# Patient Record
Sex: FEMALE | Race: WHITE
[De-identification: names, ages, dates, MRNs, and addresses within clinical notes are randomized per-mention and may not be internally consistent; named-entity substitution may affect disease eponyms.]

---

## 2017-02-17 ENCOUNTER — HOSPITAL ENCOUNTER (EMERGENCY)
Dept: HOSPITAL 17 - NEPB | Age: 27
Discharge: HOME | End: 2017-02-17
Payer: SELF-PAY

## 2017-02-17 VITALS — WEIGHT: 121.25 LBS | HEIGHT: 63 IN | BODY MASS INDEX: 21.48 KG/M2

## 2017-02-17 VITALS — RESPIRATION RATE: 18 BRPM | HEART RATE: 90 BPM | TEMPERATURE: 97.8 F

## 2017-02-17 DIAGNOSIS — E07.9: ICD-10-CM

## 2017-02-17 DIAGNOSIS — F17.210: ICD-10-CM

## 2017-02-17 DIAGNOSIS — L98.8: Primary | ICD-10-CM

## 2017-02-17 DIAGNOSIS — F31.9: ICD-10-CM

## 2017-02-17 PROCEDURE — 99283 EMERGENCY DEPT VISIT LOW MDM: CPT

## 2017-02-17 NOTE — PD
HPI


Chief Complaint:  Injury


Time Seen by Provider:  15:32


Travel History


International Travel<30 days:  No


Contact w/Intl Traveler<30days:  No


Traveled to known affect area:  No





History of Present Illness


HPI


26-year-old female history of IVDU presents to the ED under the custody of law 

enforcement  [facility] for evaluation of right arm wounds.  Patient states that she'

s been shooting IV Dilaudid, last used today.  She states that she has some 

redness of her ejection site and is worried about infection.  She endorses 

chills, has not measured a temperature at home.  She denies chest pain, 

palpitations, shortness of breath, numbness, tingling, weakness, limitations to 

range of motion of the right arm.  She denies chronic health problems, takes no 

daily medications, NKDA.





PFSH


Past Medical History


Hx Anticoagulant Therapy:  No


ADHD:  Yes


Asthma:  Yes


Autoimmune Disease:  No


Anxiety:  Yes


Depression:  No


Cancer:  No


Cardiovascular Problems:  No


Chemotherapy:  No


Chest Pain:  Yes


Congestive Heart Failure:  No


Cerebrovascular Accident:  No


Diabetes:  No


Diminished Hearing:  No


Gastrointestinal Disorders:  Yes


Genitourinary:  No


Hypertension:  No


Immune Disorder:  No


Implanted Vascular Access Dvce:  No


Musculoskeletal:  No


Neurologic:  No


Psychiatric:  Yes (BIPOLAR DISOREDER)


Respiratory:  No


Immunizations Current:  Yes


Thyroid Disease:  Yes


Ulcer:  No


:  3


Para:  2


Miscarriage:  1





Past Surgical History


Appendectomy:  No


Body Medical Devices:  BORDERLINE PERSONALITY; POLYSUBSTANCE ABUSE; ALCOHOL 

ABUSE; DEFIANT DISORDE


 Section:  Yes (X2)


Cholecystectomy:  No


Gynecologic Surgery:  Yes (C SECTIONS x2)


Hysterectomy:  No


Other Surgery:  Yes (LEFT FOREARM SURGERY)





Social History


Alcohol Use:  Yes (RUM DAILY)


Tobacco Use:  Yes (1 PPD)


Substance Use:  Yes





Allergies-Medications


(Allergen,Severity, Reaction):  


Coded Allergies:  


     *MDRO Multi-Drug Resistant Organism (Verified  Adverse Reaction, Unknown, )


 MRSA (arm wound) - 10/2015


 MRSA PCR Screen NEGATIVE - 3/24/2016 & 16


 ** Cleared per Infection Control **


Reported Meds & Prescriptions





Reported Meds & Active Scripts


Active


Bactroban Topical (Mupirocin) 2% Oint 1 Appl TOPICAL BID 10 Days


Bactrim DS (Sulfamethoxazole-Trimethoprim) 800-160 Mg Tab 1 Tab PO BID








Review of Systems


Except as stated in HPI:  all other systems reviewed are Neg





Physical Exam


Narrative


GENERAL: Well-nourished, well-developed petite white female, appearing younger 

than her stated age, in no acute distress.


SKIN: Warm and dry.  The patient has several puncture marks on the antecubital 

space of the right arm and posterior aspect of the hand.  There is a small 

amount of local reaction with no fluctuance, cellulitic streaking, 

lymphadenopathy.


HEAD: Normocephalic.


EYES: No scleral icterus. No injection or drainage. 


NECK: Supple, trachea midline. No JVD or lymphadenopathy.


CARDIOVASCULAR: Regular rate and rhythm without murmurs, gallops, or rubs. 


RESPIRATORY: Breath sounds clear and equal bilaterally. No accessory muscle use.


GASTROINTESTINAL: Abdomen soft, non-tender, nondistended.  Active bowel sounds.


MUSCULOSKELETAL: No cyanosis, or edema.  Patient retains full, active range of 

motion of eye lateral upper and lower extremities.  She is observed to walk 

with a normal gait.


BACK: Nontender without obvious deformity. No CVA tenderness.





Data


Data


Last Documented VS





Vital Signs








  Date Time  Temp Pulse Resp B/P Pulse Ox O2 Delivery O2 Flow Rate FiO2


 


17 15:05 97.8 90 18     








Orders








 Sulfamet-Trimeth Ds 800-160 Mg (Bactrim (17 16:00)











MDM


Medical Decision Making


Medical Screen Exam Complete:  Yes


Emergency Medical Condition:  Yes


Differential Diagnosis


Folliculitis versus cellulitis versus malingering versus IV drug abuse versus 

other


Narrative Course


26-year-old female history of IVDU presents to the ED under the custody of law 

enforcement  [facility] for evaluation of right arm wounds.  Patient states that she'

s been shooting IV Dilaudid, last used today.  She states that she has some 

redness of her injection sites and is worried about infection.  She endorses 

chills, has not measured a temperature at home.  She denies chest pain, 

palpitations, shortness of breath, numbness, tingling, weakness, limitations to 

range of motion of the right arm.  Vitals reviewed.  Physical exam reveals a 

petite white female in no acute distress.  There are several puncture marks in 

the antecubital space of the right arm and posterior aspect of the right hand.  

Small amount of local reaction with no fluctuance, cellulitic streaking, 

lymphadenopathy present.  Patient retains full, active range of motion of the 

right arm.  Neurovascularly intact.  Review of the patient's record reveals 

blood cultures drawn 16 were negative.  Last wound culture of 16 

through staph aureus, sensitive to Bactrim.  Given the patient's high risk I'll 

prescribe antibiotics.  Prescribed the patient Bactroban ointment twice a day 

10 days.  Also provided prescription for Bactrim DS twice a day 7 days.  

Patient is instructed to apply Bactroban as prescribed, take all antibiotics as 

prescribed even if symptoms resolve.  She indicated understanding of 

instructions.  She is stable and discharged in the law enforcement custody.





Diagnosis





 Primary Impression:  


 IVDU (intravenous drug user)


 Additional Impression:  


 Skin lesion due to intravenous drug abuse


Referrals:  


Primary Care Physician


Patient Instructions:  Bacitracin (On the skin), General Instructions





***Additional Instructions:


Apply Bactroban ointment twice a day.


Bactrim DS twice a day 7 days.


Return to the ED for increased redness, swelling, pus of the right arm.


Otherwise follow-up with her primary care provider.


Return to the ED for any urgent or emergent medical condition.


***Med/Other Pt SpecificInfo:  Prescription(s) given


Scripts


Mupirocin Topical (Bactroban Topical)2% Oint1 Appl TOPICAL BID  10 Days  Ref 0


   Prov:Jose Carlos Macedo MD         17 


Sulfamethoxazole-Trimethoprim (Bactrim DS)800-160 Mg Tab1 Tab PO BID  #14 TAB  

Ref 0


   Prov:Jose Carlos Macedo MD         17


Disposition:  [address] DIS TO [address] LAW ENFORCEMNT


Condition:  Stable








Vilma Shanks 2017 15:42

## 2017-05-24 ENCOUNTER — HOSPITAL ENCOUNTER (INPATIENT)
Dept: HOSPITAL 17 - NEPC | Age: 27
LOS: 1 days | Discharge: LEFT BEFORE DISCHARGE | DRG: 917 | End: 2017-05-25
Attending: INTERNAL MEDICINE | Admitting: INTERNAL MEDICINE
Payer: SELF-PAY

## 2017-05-24 VITALS
OXYGEN SATURATION: 100 % | SYSTOLIC BLOOD PRESSURE: 112 MMHG | RESPIRATION RATE: 20 BRPM | DIASTOLIC BLOOD PRESSURE: 65 MMHG | HEART RATE: 112 BPM

## 2017-05-24 VITALS
HEART RATE: 113 BPM | DIASTOLIC BLOOD PRESSURE: 59 MMHG | SYSTOLIC BLOOD PRESSURE: 97 MMHG | TEMPERATURE: 101.6 F | RESPIRATION RATE: 20 BRPM | OXYGEN SATURATION: 97 %

## 2017-05-24 VITALS
DIASTOLIC BLOOD PRESSURE: 56 MMHG | OXYGEN SATURATION: 99 % | HEART RATE: 111 BPM | RESPIRATION RATE: 16 BRPM | SYSTOLIC BLOOD PRESSURE: 110 MMHG

## 2017-05-24 VITALS — HEART RATE: 113 BPM

## 2017-05-24 VITALS
SYSTOLIC BLOOD PRESSURE: 106 MMHG | DIASTOLIC BLOOD PRESSURE: 57 MMHG | TEMPERATURE: 100.9 F | OXYGEN SATURATION: 99 % | RESPIRATION RATE: 16 BRPM | HEART RATE: 106 BPM

## 2017-05-24 VITALS
RESPIRATION RATE: 16 BRPM | SYSTOLIC BLOOD PRESSURE: 117 MMHG | DIASTOLIC BLOOD PRESSURE: 55 MMHG | HEART RATE: 116 BPM | OXYGEN SATURATION: 99 %

## 2017-05-24 VITALS
HEART RATE: 112 BPM | DIASTOLIC BLOOD PRESSURE: 86 MMHG | RESPIRATION RATE: 17 BRPM | OXYGEN SATURATION: 100 % | SYSTOLIC BLOOD PRESSURE: 136 MMHG

## 2017-05-24 VITALS
DIASTOLIC BLOOD PRESSURE: 56 MMHG | SYSTOLIC BLOOD PRESSURE: 105 MMHG | OXYGEN SATURATION: 99 % | RESPIRATION RATE: 16 BRPM | HEART RATE: 101 BPM

## 2017-05-24 VITALS
OXYGEN SATURATION: 98 % | SYSTOLIC BLOOD PRESSURE: 116 MMHG | DIASTOLIC BLOOD PRESSURE: 55 MMHG | RESPIRATION RATE: 16 BRPM | HEART RATE: 116 BPM

## 2017-05-24 VITALS
SYSTOLIC BLOOD PRESSURE: 128 MMHG | RESPIRATION RATE: 16 BRPM | DIASTOLIC BLOOD PRESSURE: 69 MMHG | OXYGEN SATURATION: 99 % | HEART RATE: 108 BPM

## 2017-05-24 VITALS
RESPIRATION RATE: 16 BRPM | SYSTOLIC BLOOD PRESSURE: 118 MMHG | OXYGEN SATURATION: 100 % | DIASTOLIC BLOOD PRESSURE: 65 MMHG | HEART RATE: 110 BPM

## 2017-05-24 VITALS
HEART RATE: 130 BPM | DIASTOLIC BLOOD PRESSURE: 103 MMHG | OXYGEN SATURATION: 91 % | SYSTOLIC BLOOD PRESSURE: 183 MMHG | RESPIRATION RATE: 13 BRPM

## 2017-05-24 VITALS
HEART RATE: 108 BPM | RESPIRATION RATE: 16 BRPM | SYSTOLIC BLOOD PRESSURE: 115 MMHG | DIASTOLIC BLOOD PRESSURE: 67 MMHG | OXYGEN SATURATION: 100 %

## 2017-05-24 VITALS — OXYGEN SATURATION: 100 %

## 2017-05-24 VITALS
HEART RATE: 119 BPM | HEIGHT: 65 IN | DIASTOLIC BLOOD PRESSURE: 72 MMHG | BODY MASS INDEX: 24.61 KG/M2 | WEIGHT: 147.71 LBS | OXYGEN SATURATION: 97 % | SYSTOLIC BLOOD PRESSURE: 128 MMHG | RESPIRATION RATE: 13 BRPM | TEMPERATURE: 97.6 F

## 2017-05-24 VITALS
RESPIRATION RATE: 13 BRPM | SYSTOLIC BLOOD PRESSURE: 146 MMHG | HEART RATE: 115 BPM | OXYGEN SATURATION: 77 % | DIASTOLIC BLOOD PRESSURE: 71 MMHG

## 2017-05-24 VITALS
DIASTOLIC BLOOD PRESSURE: 84 MMHG | HEART RATE: 117 BPM | RESPIRATION RATE: 13 BRPM | SYSTOLIC BLOOD PRESSURE: 136 MMHG | OXYGEN SATURATION: 100 %

## 2017-05-24 VITALS
SYSTOLIC BLOOD PRESSURE: 105 MMHG | HEART RATE: 105 BPM | RESPIRATION RATE: 16 BRPM | OXYGEN SATURATION: 99 % | DIASTOLIC BLOOD PRESSURE: 59 MMHG

## 2017-05-24 VITALS
OXYGEN SATURATION: 100 % | DIASTOLIC BLOOD PRESSURE: 60 MMHG | RESPIRATION RATE: 16 BRPM | HEART RATE: 114 BPM | SYSTOLIC BLOOD PRESSURE: 112 MMHG

## 2017-05-24 VITALS
SYSTOLIC BLOOD PRESSURE: 108 MMHG | RESPIRATION RATE: 15 BRPM | HEART RATE: 117 BPM | DIASTOLIC BLOOD PRESSURE: 52 MMHG | OXYGEN SATURATION: 99 %

## 2017-05-24 VITALS
HEART RATE: 114 BPM | DIASTOLIC BLOOD PRESSURE: 53 MMHG | SYSTOLIC BLOOD PRESSURE: 107 MMHG | OXYGEN SATURATION: 100 % | RESPIRATION RATE: 16 BRPM

## 2017-05-24 VITALS
SYSTOLIC BLOOD PRESSURE: 111 MMHG | RESPIRATION RATE: 16 BRPM | OXYGEN SATURATION: 100 % | DIASTOLIC BLOOD PRESSURE: 60 MMHG | HEART RATE: 101 BPM

## 2017-05-24 VITALS — RESPIRATION RATE: 20 BRPM | SYSTOLIC BLOOD PRESSURE: 102 MMHG | DIASTOLIC BLOOD PRESSURE: 63 MMHG | HEART RATE: 112 BPM

## 2017-05-24 VITALS — OXYGEN SATURATION: 99 % | HEART RATE: 117 BPM | RESPIRATION RATE: 18 BRPM

## 2017-05-24 VITALS
OXYGEN SATURATION: 100 % | SYSTOLIC BLOOD PRESSURE: 98 MMHG | DIASTOLIC BLOOD PRESSURE: 62 MMHG | HEART RATE: 112 BPM | RESPIRATION RATE: 20 BRPM

## 2017-05-24 VITALS — OXYGEN SATURATION: 99 %

## 2017-05-24 VITALS — OXYGEN SATURATION: 96 %

## 2017-05-24 DIAGNOSIS — J96.00: ICD-10-CM

## 2017-05-24 DIAGNOSIS — N17.9: ICD-10-CM

## 2017-05-24 DIAGNOSIS — F31.9: ICD-10-CM

## 2017-05-24 DIAGNOSIS — F13.20: ICD-10-CM

## 2017-05-24 DIAGNOSIS — R73.9: ICD-10-CM

## 2017-05-24 DIAGNOSIS — F12.20: ICD-10-CM

## 2017-05-24 DIAGNOSIS — F10.20: ICD-10-CM

## 2017-05-24 DIAGNOSIS — F14.20: ICD-10-CM

## 2017-05-24 DIAGNOSIS — T42.4X1A: Primary | ICD-10-CM

## 2017-05-24 DIAGNOSIS — T40.5X1A: ICD-10-CM

## 2017-05-24 LAB
AMPHETAMINE, URINE: (no result)
ANION GAP SERPL CALC-SCNC: 12 MEQ/L (ref 5–15)
APAP SERPL-MCNC: (no result) MCG/ML (ref 10–30)
BACTERIA #/AREA URNS HPF: (no result) /HPF
BARBITURATES, URINE: (no result)
BASE EXCESS BLD CALC-SCNC: 0.9 MMOL/L (ref -2–2)
BASOPHILS # BLD AUTO: 0 TH/MM3 (ref 0–0.2)
BASOPHILS NFR BLD: 0.2 % (ref 0–2)
BENZODIAZEPINES PNL UR: 95 % (ref 90–100)
BETA HCG QUANT: (no result) MIU/ML (ref 0–5)
BLOOD GAS CARBOXYHEMOGLOBIN: 2.2 % (ref 0–4)
BLOOD GAS HCO3: 26 MMOL/L (ref 22–26)
BLOOD GAS OXYGEN CONTENT: 15.7 VOL % (ref 12–20)
BLOOD GAS PCO2: 49 MMHG (ref 38–42)
BUN SERPL-MCNC: 18 MG/DL (ref 7–18)
CHLORIDE SERPL-SCNC: 100 MEQ/L (ref 98–107)
COCAINE UR-MCNC: (no result) NG/ML
COLOR UR: YELLOW
COMMENT (UR): (no result)
CRITICAL VALUE: NO
CULTURE IF INDICATED: (no result)
DRAW SITE: (no result)
EOSINOPHIL # BLD: 0.1 TH/MM3 (ref 0–0.4)
EOSINOPHIL NFR BLD: 0.9 % (ref 0–4)
ERYTHROCYTE [DISTWIDTH] IN BLOOD BY AUTOMATED COUNT: 12.1 % (ref 11.6–17.2)
GFR SERPLBLD BASED ON 1.73 SQ M-ARVRAT: 36 ML/MIN (ref 89–?)
GLUCOSE UR STRIP-MCNC: 300 MG/DL
GRAN CASTS #/AREA URNS LPF: 1 /LPF
HCO3 BLD-SCNC: 26.6 MEQ/L (ref 21–32)
HCT VFR BLD CALC: 34.4 % (ref 35–46)
HEMO FLAGS: (no result)
HGB UR QL STRIP: (no result)
HYALINE CASTS #/AREA URNS LPF: 1 /LPF
KETONES UR STRIP-MCNC: (no result) MG/DL
LYMPHOCYTES # BLD AUTO: 2 TH/MM3 (ref 1–4.8)
LYMPHOCYTES NFR BLD AUTO: 23.9 % (ref 9–44)
MCH RBC QN AUTO: 30.4 PG (ref 27–34)
MCHC RBC AUTO-ENTMCNC: 33.7 % (ref 32–36)
MCV RBC AUTO: 90 FL (ref 80–100)
METHGB MFR BLDA: 0.8 % (ref 0–2)
MONOCYTES NFR BLD: 6.4 % (ref 0–8)
MUCOUS THREADS #/AREA URNS LPF: (no result) /LPF
NEUTROPHILS # BLD AUTO: 5.6 TH/MM3 (ref 1.8–7.7)
NEUTROPHILS NFR BLD AUTO: 68.6 % (ref 16–70)
NITRITE UR QL STRIP: (no result)
NUMBER OF ARTERIAL PUNCTURES: 1
O2/TOTAL GAS SETTING VFR VENT: 60 %
OXYGEN DEVICE: (no result)
PLATELET # BLD: 222 TH/MM3 (ref 150–450)
PO2 BLD: 99 MMHG (ref 61–120)
POTASSIUM SERPL-SCNC: 3.7 MEQ/L (ref 3.5–5.1)
RBC # BLD AUTO: 3.82 MIL/MM3 (ref 4–5.3)
SALICYLATES SERPL-MCNC: 11.7 G/DL (ref 12–16)
SODIUM SERPL-SCNC: 139 MEQ/L (ref 136–145)
SP GR UR STRIP: 1.02 (ref 1–1.03)
SQUAMOUS #/AREA URNS HPF: <1 /HPF (ref 0–5)
STAT: YES
TEMP CORR TO: 98.6
TRANS CELLS #/AREA URNS HPF: <1 /HPF
ULNAR PULSE: PRESENT
VENT SETTINGS: (no result)
WBC # BLD AUTO: 8.2 TH/MM3 (ref 4–11)

## 2017-05-24 PROCEDURE — 81001 URINALYSIS AUTO W/SCOPE: CPT

## 2017-05-24 PROCEDURE — 51702 INSERT TEMP BLADDER CATH: CPT

## 2017-05-24 PROCEDURE — 86403 PARTICLE AGGLUT ANTBDY SCRN: CPT

## 2017-05-24 PROCEDURE — 80048 BASIC METABOLIC PNL TOTAL CA: CPT

## 2017-05-24 PROCEDURE — 82805 BLOOD GASES W/O2 SATURATION: CPT

## 2017-05-24 PROCEDURE — 94002 VENT MGMT INPAT INIT DAY: CPT

## 2017-05-24 PROCEDURE — C9113 INJ PANTOPRAZOLE SODIUM, VIA: HCPCS

## 2017-05-24 PROCEDURE — 71010: CPT

## 2017-05-24 PROCEDURE — 94664 DEMO&/EVAL PT USE INHALER: CPT

## 2017-05-24 PROCEDURE — 94640 AIRWAY INHALATION TREATMENT: CPT

## 2017-05-24 PROCEDURE — 87077 CULTURE AEROBIC IDENTIFY: CPT

## 2017-05-24 PROCEDURE — 80053 COMPREHEN METABOLIC PANEL: CPT

## 2017-05-24 PROCEDURE — 83735 ASSAY OF MAGNESIUM: CPT

## 2017-05-24 PROCEDURE — 87070 CULTURE OTHR SPECIMN AEROBIC: CPT

## 2017-05-24 PROCEDURE — 96374 THER/PROPH/DIAG INJ IV PUSH: CPT

## 2017-05-24 PROCEDURE — 87147 CULTURE TYPE IMMUNOLOGIC: CPT

## 2017-05-24 PROCEDURE — 94150 VITAL CAPACITY TEST: CPT

## 2017-05-24 PROCEDURE — 80307 DRUG TEST PRSMV CHEM ANLYZR: CPT

## 2017-05-24 PROCEDURE — 0BH17EZ INSERTION OF ENDOTRACHEAL AIRWAY INTO TRACHEA, VIA NATURAL OR ARTIFICIAL OPENING: ICD-10-PCS

## 2017-05-24 PROCEDURE — 87205 SMEAR GRAM STAIN: CPT

## 2017-05-24 PROCEDURE — 85025 COMPLETE CBC W/AUTO DIFF WBC: CPT

## 2017-05-24 PROCEDURE — 87040 BLOOD CULTURE FOR BACTERIA: CPT

## 2017-05-24 PROCEDURE — 70450 CT HEAD/BRAIN W/O DYE: CPT

## 2017-05-24 PROCEDURE — 5A1935Z RESPIRATORY VENTILATION, LESS THAN 24 CONSECUTIVE HOURS: ICD-10-PCS

## 2017-05-24 PROCEDURE — 87641 MR-STAPH DNA AMP PROBE: CPT

## 2017-05-24 PROCEDURE — 96375 TX/PRO/DX INJ NEW DRUG ADDON: CPT

## 2017-05-24 PROCEDURE — 87186 SC STD MICRODIL/AGAR DIL: CPT

## 2017-05-24 PROCEDURE — 80178 ASSAY OF LITHIUM: CPT

## 2017-05-24 PROCEDURE — 82948 REAGENT STRIP/BLOOD GLUCOSE: CPT

## 2017-05-24 PROCEDURE — 31500 INSERT EMERGENCY AIRWAY: CPT

## 2017-05-24 PROCEDURE — 76937 US GUIDE VASCULAR ACCESS: CPT

## 2017-05-24 PROCEDURE — 84702 CHORIONIC GONADOTROPIN TEST: CPT

## 2017-05-24 PROCEDURE — 94003 VENT MGMT INPAT SUBQ DAY: CPT

## 2017-05-24 PROCEDURE — 36600 WITHDRAWAL OF ARTERIAL BLOOD: CPT

## 2017-05-24 PROCEDURE — 84100 ASSAY OF PHOSPHORUS: CPT

## 2017-05-24 RX ADMIN — TAZOBACTAM SODIUM AND PIPERACILLIN SODIUM SCH MLS/HR: 500; 4 INJECTION, SOLUTION INTRAVENOUS at 17:20

## 2017-05-24 RX ADMIN — PROPOFOL SCH MLS/HR: 10 INJECTION, EMULSION INTRAVENOUS at 18:39

## 2017-05-24 RX ADMIN — PHENYTOIN SODIUM SCH MLS/HR: 50 INJECTION INTRAMUSCULAR; INTRAVENOUS at 15:17

## 2017-05-24 RX ADMIN — PANTOPRAZOLE SODIUM SCH MG: 40 INJECTION, POWDER, FOR SOLUTION INTRAVENOUS at 15:17

## 2017-05-24 RX ADMIN — PHENYTOIN SODIUM SCH MLS/HR: 50 INJECTION INTRAMUSCULAR; INTRAVENOUS at 14:01

## 2017-05-24 RX ADMIN — PHENYTOIN SODIUM SCH MLS/HR: 50 INJECTION INTRAMUSCULAR; INTRAVENOUS at 17:20

## 2017-05-24 RX ADMIN — IPRATROPIUM BROMIDE AND ALBUTEROL SULFATE SCH AMPULE: .5; 3 SOLUTION RESPIRATORY (INHALATION) at 20:47

## 2017-05-24 RX ADMIN — IPRATROPIUM BROMIDE AND ALBUTEROL SULFATE SCH AMPULE: .5; 3 SOLUTION RESPIRATORY (INHALATION) at 16:15

## 2017-05-24 RX ADMIN — STANDARDIZED SENNA CONCENTRATE AND DOCUSATE SODIUM SCH TAB: 8.6; 5 TABLET, FILM COATED ORAL at 13:00

## 2017-05-24 RX ADMIN — STANDARDIZED SENNA CONCENTRATE AND DOCUSATE SODIUM SCH TAB: 8.6; 5 TABLET, FILM COATED ORAL at 23:20

## 2017-05-24 RX ADMIN — PHENYTOIN SODIUM SCH MLS/HR: 50 INJECTION INTRAMUSCULAR; INTRAVENOUS at 23:28

## 2017-05-24 RX ADMIN — HUMAN INSULIN SCH: 100 INJECTION, SOLUTION SUBCUTANEOUS at 18:00

## 2017-05-24 RX ADMIN — TAZOBACTAM SODIUM AND PIPERACILLIN SODIUM SCH MLS/HR: 500; 4 INJECTION, SOLUTION INTRAVENOUS at 23:20

## 2017-05-24 RX ADMIN — PROPOFOL SCH MLS/HR: 10 INJECTION, EMULSION INTRAVENOUS at 10:35

## 2017-05-24 RX ADMIN — HUMAN INSULIN SCH: 100 INJECTION, SOLUTION SUBCUTANEOUS at 23:20

## 2017-05-24 RX ADMIN — ACETAMINOPHEN PRN MG: 650 SOLUTION ORAL at 18:18

## 2017-05-24 RX ADMIN — PHENYTOIN SODIUM SCH MLS/HR: 50 INJECTION INTRAMUSCULAR; INTRAVENOUS at 15:14

## 2017-05-24 RX ADMIN — HUMAN INSULIN SCH: 100 INJECTION, SOLUTION SUBCUTANEOUS at 13:00

## 2017-05-24 NOTE — RADRPT
EXAM DATE/TIME:  05/24/2017 12:51 

 

[facility] COMPARISON:     

No previous studies available for comparison.

 

 

INDICATIONS :     

Altered mental status

                      

 

RADIATION DOSE:     

39.32 CTDIvol (mGy) 

 

 

 

MEDICAL HISTORY :     

Non-responsive.  

 

SURGICAL HISTORY :      

Non-responsive. 

 

ENCOUNTER:      

Initial

 

ACUITY:      

1 day

 

PAIN SCALE:      

Non-responsive

 

LOCATION:        

cranial 

 

TECHNIQUE:     

Multiple contiguous axial images were obtained of the head.  Using automated exposure control and adj
ustment of the mA and/or kV according to patient size, radiation dose was kept as low as reasonably a
chievable to obtain optimal diagnostic quality images. 

 

FINDINGS:     

 

CEREBRUM:     

The ventricles are normal for age.  No evidence of midline shift, mass lesion, hemorrhage or acute in
farction. Punctate calcification in the region of third ventricle. No extra-axial fluid collections a
re seen.

 

POSTERIOR FOSSA:     

The cerebellum and brainstem are intact.  The 4th ventricle is midline.  The cerebellopontine angle i
s unremarkable.

 

EXTRACRANIAL:     

The visualized portion of the orbits is intact.

 

SKULL:     

The calvaria is intact.  No evidence of skull fracture.

 

CONCLUSION:     

No acute intracranial disease.  

 

 

 

 George Ventura MD on May 24, 2017 at 12:57           

Board Certified Radiologist.

 This report was verified electronically.

## 2017-05-24 NOTE — MH
cc:

CASEY MARIN M.D.

****

 

DATE OF ADMISSION:  5/24/2017

 

HISTORY OF PRESENT ILLNESS

The patient appears to be in her mid 20s.  She presented to [facility] ED via EMS for suspected drug overdose.  She was last seen normal at the

methadone clinic at 6:00 a.m. and when the paramedics arrived they found her

unconscious with a respiratory rate of 4.  She had pinpoint pupils and appears

to have aspirated.  She was noted to have multiple track marks on her upper

extremities.  On arrival to the ER she was tachycardic and hypertensive.  The

patient was intubated with etomidate and succinylcholine and placed on full

mechanical ventilation.  She was given Narcan 2 mg IV with minimal response.

ABG post intubation showed a pH of 7.34, CO2 49, PAO2 99, bicarb 26, sats of

95% on PRVC mode, rate of 16, tidal volume 450, PEEP of 5, FIO2 60%.  In the ER

she was given one liter of normal saline and placed on Diprivan infusion for

sedation.  Chest x-ray post intubation showed ET tube 1 cm above the aurelia,

otherwise clear lungs.  The patient also had a CT scan of the brain which

showed no acute intracranial disease.  Per ED records the patient takes lithium

and her lithium level is pending.

 

The rest of the history is limited as the patient is intubated.

 

PAST MEDICAL HISTORY

Unknown.

 

PAST SURGICAL HISTORY

Unknown.

 

ALLERGIES

Unobtainable.

 

FAMILY HISTORY

Unobtainable.

 

MEDICATIONS

Reported medications include:

1. Lithium.

2. Mirtazapine.

3. Risperidone.

 

REVIEW OF SYSTEMS

As per HPI.  The rest of the review of systems is limited.

 

PHYSICAL EXAMINATION

GENERAL:  The patient is in her mid 20s, intubated for airway protection.

VITAL SIGNS:  Temperature 97.6, pulse 114, respiratory rate 16, blood pressure

115/67, saturation 100% on vent setting PRVC rate of 14, tidal volume 450, PEEP

5, FIO2 60%.

HEENT:  Atraumatic, normocephalic.  Pupils equal, round and reactive to light

and accommodation.  Pinpoint pupils noted.  Non-icteric sclera.

NECK:  Supple.  No JVD, adenopathy or thyromegaly.  Trachea midline.  Orally

intubated.

CARDIOVASCULAR:  Tachycardic.  Normal S1, S2.  No murmurs, rubs or gallops

noted.

PULMONARY:  Bilateral equal air entry.  No crackles or wheezing.

ABDOMEN:  Soft, nontender, no distension.  Positive bowel sounds.

EXTREMITIES:  No cyanosis, clubbing or edema.  Multiple track marks noted in

the right upper extremities.

NEUROLOGIC:  Intubated and on Diprivan infusion.

 

LABORATORY DATA

WBC 8.2, hemoglobin 11.6, hematocrit 34, platelet count 222.

 

Sodium 139, potassium 3.7, chloride 100, CO2 26, BUN 18, creatinine 1.29,

glucose 340, calcium 8.2.

 

Urine drug screen positive for benzodiazepines, cocaine.

Aspirin level less than 1.7.

Tylenol level less than 2.

Alcohol level less than 3.

Lithium level pending.

 

RADIOGRAPHIC STUDIES

Chest x-ray showed no obvious infiltrates or effusion, ET tube 1 cm above the

aurelia.

 

CT scan of the brain showed no acute intracranial abnormalities.

 

IMPRESSION

1. Acute respiratory failure.

2. Altered mental status.

3. Polysubstance abuse with urine drug screen positive for cocaine and

   benzodiazepines.

4. Acute kidney injury.

5. Hyperglycemia.

 

PLAN/RECOMMENDATIONS

1. Continue Diprivan infusion for sedation and daily sedation vacation.

   Monitor neuro status closely.  A CT scan of the brain showed no acute

   intracranial abnormalities.  Urine drug screen positive for benzodiazepines

   and cocaine.  Follow-up on lithium level.  Will consult the psych service

   once extubated.

2. Continue with vent support and maintain sats above 92%.

3. Bronchodilators in the form of DuoNeb q.6h.  Will initiate ICU vent bundle.

 

4. Monitor heart rate and blood pressure closely and maintain MAP greater than

   65 mmHg.  She was given one liter of crystalloid in the ED.  Will place on

   maintenance fluids, NS at 84 mL an hour.

5. Monitor renal function, I's and O's, and electrolyte replacement per

   protocol.  Continue with IV fluids as stated above.

6. Keep n.p.o. for now and place on Protonix 40 mg IV daily for GI prophylaxis.

   Will start nutrition support with tube feeds within the next 24 hours if

   remains intubated.

7. Monitor for signs of infections which include fever and WBC.  Panculture if

   spikes a fever.  A chest x-ray post intubation showed clear lungs and

   urinalysis showed 8 wbc's, negative leukocyte esterase, negative nitrite.

8. Place on medium scale sliding scale insulin with Accu-Cheks q.6h. for

   glycemic control.

9. GI prophylaxis with Protonix 40 mg daily and DVT prophylaxis with SCDs.

10. Further recommendations will be based on the hospital course.

 

Critical care time 35 minutes.

 

 

 

                               __________________________________

                           MD SYLVIE Borden/NAZANIN

D:  5/24/2017/1:06 PM

T:  5/24/2017/1:25 PM

Visit #:  [id number]

Job #:  [id number]

## 2017-05-24 NOTE — PD
HPI


Chief Complaint:  Altered Mental Status


Time Seen by Provider:  10:19


Travel History


International Travel<30 days:  No


Contact w/Intl Traveler<30days:  No


Traveled to known affect area:  No





History of Present Illness


HPI


This is a female who appears to be in her mid 20s, who presents via EMS for 

suspected overdose.  The patient was last seen normal at the methadone clinic 

at 6 AM.  When paramedics arrived they found the patient unconscious with a 

respiratory rate of 4.  She had pinpoint pupils and appeared to have aspirated.

  No further history could be obtained.  The patient was noted to have multiple 

track marks on her upper extremities.





[facility]


Past Medical History


Medical History:  Unable to Obtain


Pregnant?:  Unknown





Past Surgical History


Surgical History:  Unable to Obtain





Social History


Alcohol Use:  No (UNKNOWN )


Tobacco Use:  No


Substance Use:  Yes (UNKNOWN )





Allergies-Medications


(Allergen,Severity, Reaction):  


Coded Allergies:  


     UNOBTAINABLE (Unverified , 5/24/17)


Reported Meds & Prescriptions





Reported Meds & Active Scripts


Active


Reported


Lithium Carbonate 300 Mg Cap 300 Mg PO BID


Risperidone 3 Mg Tab 3 Mg PO Q12HR


Mirtazapine 15 Mg Tab 15 Mg PO HS








Review of Systems


ROS Limitations:  Clinical Condition, Altered Mental Status


Except as stated in HPI:  all other systems reviewed are Neg (unable to obtain 

secondary to patient's altered mental status)





Physical Exam


Narrative


GENERAL: Well-developed well-nourished female who is being bag-valve-mask 

ventilated by the paramedics.


SKIN: Focused skin assessment warm/dry.


HEAD: Atraumatic. Normocephalic. 


EYES: Pupils equal and round and pinpoint. No scleral icterus. No injection or 

drainage. 


ENT: Anterior airway in place.  There was blood noted at the right naris with 

ENT was placed.


NECK: Trachea midline. No JVD.  Supple scars noted.


CARDIOVASCULAR: Regular rate and rhythm.  No murmur appreciated.


RESPIRATORY: Coarse rhonchi bilaterally.  Patient's restaurant rate on her own 

was 4-6.


GASTROINTESTINAL: Abdomen soft, nondistended.


MUSCULOSKELETAL: No obvious deformities. No clubbing.  No cyanosis.  No edema.  

Multiple track marks in her right upper extremities.


NEUROLOGICAL: Unconscious.  She was observed moving her extremities but not to 

command but to painful stimuli.





Data


Data


Last Documented VS





Vital Signs








  Date Time  Temp Pulse Resp B/P Pulse Ox O2 Delivery O2 Flow Rate FiO2


 


5/24/17 11:30  112 20 112/65 100 Ventilator  


 


5/24/17 10:40        100


 


5/24/17 09:47 97.6       








Orders





 Naloxone Inj (Narcan Inj) (5/24/17 09:49)


Succinylcholine Inj (Quelicin Inj) (5/24/17 10:01)


Etomidate Inj (Amidate Inj) (5/24/17 10:02)


Propofol 1000 Mg/100 Ml Inj (Diprivan 10 (5/24/17 10:08)


Basic Metabolic Panel (Bmp) (5/24/17 10:19)


Beta Hcg (Quant/Titer) (5/24/17 10:19)


Complete Blood Count With Diff (5/24/17 10:19)


Urinalysis - C+S If Indicated (5/24/17 10:19)


Chest, Single Ap (5/24/17 10:19)


Ct Brain W/O Iv Contrast(Rout) (5/24/17 10:19)


Arterial Blood Gas (Abg) (5/24/17 10:19)


Iv Access Insert/Monitor (5/24/17 10:19)


Ecg Monitoring (5/24/17 10:19)


Oximetry (5/24/17 10:19)


Sodium Chloride 0.9% Flush (Ns Flush) (5/24/17 10:30)


Restraints Non-Violent MARV.Q3H (5/24/17 10:19)


Drug Screen, Random Urine (5/24/17 10:19)


Alcohol (Ethanol) (5/24/17 10:19)


Salicylates (Aspirin) (5/24/17 10:19)


Tylenol (Acetaminophen) (5/24/17 10:19)


Propofol 1000 Mg/100 Ml Inj (Diprivan 10 (5/24/17 10:30)


^ Infusion (5/24/17 10:19)


RASS (5/24/17 10:19)


Neurological Rass Scale MARV.Q2H (5/24/17 10:19)


Urinary Catheter Insert/Apply (5/24/17 10:19)


Etomidate Inj (Amidate Inj) (5/24/17 11:00)


Succinylcholine Inj (Quelicin Inj) (5/24/17 11:00)


Naloxone Inj (Narcan Inj) (5/24/17 11:00)


Lithium (Li) (5/24/17 11:20)


Sodium Chlor 0.9% 1000 Ml Inj (Ns 1000 M (5/24/17 11:30)


Insulin Human Regular Inj (Novolin R Inj (5/24/17 11:30)


Vascular Access Team Consult/P PRN (5/24/17 11:50)


Vascular Poc Ultrasound (5/24/17 )


Admit Order (Ed Use Only) (5/24/17 12:41)





Labs





 Laboratory Tests








Test 5/24/17 5/24/17 5/24/17 5/24/17





 10:20 10:30 11:32 12:39


 


White Blood Count 8.2 TH/MM3   


 


Red Blood Count 3.82 MIL/MM3   


 


Hemoglobin 11.6 GM/DL   


 


Hematocrit 34.4 %   


 


Mean Corpuscular Volume 90.0 FL   


 


Mean Corpuscular Hemoglobin 30.4 PG   


 


Mean Corpuscular Hemoglobin 33.7 %   





Concent    


 


Red Cell Distribution Width 12.1 %   


 


Platelet Count 222 TH/MM3   


 


Mean Platelet Volume 7.7 FL   


 


Neutrophils (%) (Auto) 68.6 %   


 


Lymphocytes (%) (Auto) 23.9 %   


 


Monocytes (%) (Auto) 6.4 %   


 


Eosinophils (%) (Auto) 0.9 %   


 


Basophils (%) (Auto) 0.2 %   


 


Neutrophils # (Auto) 5.6 TH/MM3   


 


Lymphocytes # (Auto) 2.0 TH/MM3   


 


Monocytes # (Auto) 0.5 TH/MM3   


 


Eosinophils # (Auto) 0.1 TH/MM3   


 


Basophils # (Auto) 0.0 TH/MM3   


 


CBC Comment DIFF FINAL    


 


Differential Comment     


 


Sodium Level 139 MEQ/L   


 


Potassium Level 3.7 MEQ/L   


 


Chloride Level 100 MEQ/L   


 


Carbon Dioxide Level 26.6 MEQ/L   


 


Anion Gap 12 MEQ/L   


 


Blood Urea Nitrogen 18 MG/DL   


 


Creatinine 1.29 MG/DL   


 


Estimat Glomerular Filtration 36 ML/MIN   





Rate    


 


Random Glucose 340 MG/DL   


 


Calcium Level 8.2 MG/DL   


 


Human Chorionic Gonadotropin, LESS THAN 1   





Quant MIU/ML   


 


Salicylates Level LESS THAN 1.7   





 MG/DL   


 


Acetaminophen Level LESS THAN 2.0   





 MCG/ML   


 


Ethyl Alcohol Level LESS THAN 3   





 MG/DL   


 


Urine Color  YELLOW   


 


Urine Turbidity  CLEAR   


 


Urine pH  6.5   


 


Urine Specific Gravity  1.019   


 


Urine Protein  30 mg/dL  


 


Urine Glucose (UA)  300 mg/dL  


 


Urine Ketones  NEG mg/dL  


 


Urine Occult Blood  LARGE   


 


Urine Nitrite  NEG   


 


Urine Bilirubin  NEG   


 


Urine Urobilinogen  LESS THAN 2.0  





  MG/DL  


 


Urine Leukocyte Esterase  NEG   


 


Urine RBC  1 /hpf  


 


Urine WBC  8 /hpf  


 


Urine Squamous Epithelial  <1 /hpf  





Cells    


 


Urine Transitional Epithelial  <1 /hpf  





Cells    


 


Urine Bacteria  RARE /hpf  


 


Urine Hyaline Casts  1 /lpf  


 


Urine Granular Casts  1 /lpf  


 


Urine Mucus  FEW /lpf  


 


Microscopic Urinalysis Comment  CULT NOT  





  INDICATED  


 


Urine Opiates Screen  NEG   


 


Urine Barbiturates Screen  NEG   


 


Urine Amphetamines Screen  NEG   


 


Urine Benzodiazepines Screen  POS   


 


Urine Cocaine Screen  POS   


 


Urine Cannabinoids Screen  NEG   


 


Blood Gas Puncture Site   RT RADIAL  


 


Blood Gas Patient Temperature   98.6  


 


Blood Gas HCO3   26 mmol/L 


 


Blood Gas Base Excess   0.9 mmol/L 


 


Blood Gas Oxygen Saturation   95 % 


 


Arterial Blood pH   7.34  


 


Arterial Blood Partial   49 mmHg 





Pressure CO2    


 


Arterial Blood Partial   99 mmHG 





Pressure O2    


 


Arterial Blood Oxygen Content   15.7 Vol % 


 


Arterial Blood   2.2 % 





Carboxyhemoglobin    


 


Arterial Blood Methemoglobin   0.8 % 


 


Blood Gas Hemoglobin   11.7 G/DL 


 


Oxygen Delivery Device   VENTILATOR  


 


Blood Gas Ventilator Setting   450/16/PEEP5/1.O 





    


 


Blood Gas Inspired Oxygen   60 % 


 


Lithium Level    0.6 MEQ/L











MDM


Medical Decision Making


Medical Screen Exam Complete:  Yes


Emergency Medical Condition:  Yes


Differential Diagnosis


Opiate overdose versus coIngestion versus metabolic arrangement versus 

intracranial injury.


Narrative Course


This is a mid 19-year-old appearing female who was brought in unresponsive with 

suspected opiate overdose.  The patient was given 2 mg of IVD Narcan with 

minimal response.  The patient had pinpoint pupils when she arrived.  There was 

concern that she does prior to arrival.  The patient's fianc did come in after 

we intubated the patient reported that she had been started on psychiatric 

medications.  He was unsure of the medications and was sent home to get them.  

When he arrived one of her medications was lithium.  Lithium level is pending 

at this time.  The patient's also noted to have kidney injury and a blood 

glucose of 340.  She's been started on I V fluids at 1 25 cc per hour.  She's 

been given 6 units of Regular Insulin I V times one dose.  The patient has 

positive cocaine and benzodiazepines in her tox screen.  Lithium level was 0.6.

  CT scan of the brain shows no evidence of acute intra-cranial pathology.  The 

patient will be admitted to the ICU under Dr. Morfin.


Critical Care Narrative


Aggregate critical care time was 45 minutes. Time to perform other separately 

billable procedures was not included in the critical care time. My time did not 

include minutes spent treating any other patients simultaneously or on 

activities that did not directly contribute to the patient's treatment.  





The services I provided to this patient were to treat and/or prevent clinically 

significant deterioration that could result in: Death





I provided critical care services requiring my management, as noted below:


Chart data review, documentation time, medication orders and management, vital 

sign assessments/reviewing monitor data, ordering and reviewing lab tests, 

ordering and interpreting/reviewing x-rays and diagnostic studies, care of the 

patient and discussion of the patient with the admitting physicians.





Procedures


**Procedure Narrative**


Patient was emergently intubated:


INTUBATION: The patient was put in optimal position for the procedure.  Rapid 

sequence intubation was initiated by me using 20 milligrams of etomidate IV and 

100 milligrams of succinyl choline IV.  The patient was intubated with a 7.5 

cuffed endotracheal tube.  Tube placement was confirmed by visualization of the 

tube and balloon passing through the cords, capnometry and subsequent chest x-

ray.  Breath sounds were equal and well aerated bilaterally postintubation.  No 

breath sounds over stomach.  Patient tolerated procedure well.





Diagnosis





 Primary Impression:  


 Overdose


 Additional Impressions:  


 Polysubstance abuse


 Acute kidney injury


 Hyperglycemia





Admitting Information


Admitting Physician Requests:  Admit








Montez Caraballo MD May 24, 2017 10:48

## 2017-05-24 NOTE — RADRPT
EXAM DATE/TIME:  05/24/2017 10:26 

 

[facility] COMPARISON:     

No previous studies available for comparison.

 

                     

INDICATIONS :     

Evaluate lung status.  Patient unresponsive.

                     

 

MEDICAL HISTORY :            

Unobtainable.   

 

SURGICAL HISTORY :        

Unobtainable.

 

ENCOUNTER:     

Initial                                        

 

ACUITY:     

1 day      

 

PAIN SCORE:     

Non-responsive.

 

LOCATION:     

Bilateral chest 

 

FINDINGS:     

A single view of the chest demonstrates the lungs to be symmetrically aerated without evidence of mas
s, infiltrate or effusion.  Endotracheal tube with tip 1 cm above the aurelia. The cardiomediastinal c
ontours are unremarkable.  Osseous structures are intact.

 

CONCLUSION:     

1. Endotracheal tube tip 1 cm above the aurelia.

2. Lungs are clear.

 

 

 

 George Ventura MD on May 24, 2017 at 10:36           

Board Certified Radiologist.

 This report was verified electronically.

## 2017-05-25 VITALS
TEMPERATURE: 101.3 F | SYSTOLIC BLOOD PRESSURE: 112 MMHG | HEART RATE: 101 BPM | RESPIRATION RATE: 16 BRPM | OXYGEN SATURATION: 100 % | DIASTOLIC BLOOD PRESSURE: 61 MMHG

## 2017-05-25 VITALS
SYSTOLIC BLOOD PRESSURE: 118 MMHG | DIASTOLIC BLOOD PRESSURE: 63 MMHG | OXYGEN SATURATION: 99 % | RESPIRATION RATE: 16 BRPM | HEART RATE: 96 BPM

## 2017-05-25 VITALS — OXYGEN SATURATION: 98 %

## 2017-05-25 VITALS
SYSTOLIC BLOOD PRESSURE: 112 MMHG | OXYGEN SATURATION: 98 % | RESPIRATION RATE: 15 BRPM | DIASTOLIC BLOOD PRESSURE: 73 MMHG | HEART RATE: 109 BPM

## 2017-05-25 VITALS
SYSTOLIC BLOOD PRESSURE: 110 MMHG | HEART RATE: 113 BPM | OXYGEN SATURATION: 98 % | DIASTOLIC BLOOD PRESSURE: 74 MMHG | RESPIRATION RATE: 16 BRPM

## 2017-05-25 VITALS
DIASTOLIC BLOOD PRESSURE: 53 MMHG | RESPIRATION RATE: 16 BRPM | SYSTOLIC BLOOD PRESSURE: 101 MMHG | OXYGEN SATURATION: 97 % | HEART RATE: 101 BPM

## 2017-05-25 VITALS
OXYGEN SATURATION: 96 % | SYSTOLIC BLOOD PRESSURE: 109 MMHG | HEART RATE: 108 BPM | RESPIRATION RATE: 14 BRPM | DIASTOLIC BLOOD PRESSURE: 68 MMHG

## 2017-05-25 VITALS
HEART RATE: 109 BPM | OXYGEN SATURATION: 98 % | SYSTOLIC BLOOD PRESSURE: 110 MMHG | RESPIRATION RATE: 16 BRPM | TEMPERATURE: 100.8 F | DIASTOLIC BLOOD PRESSURE: 64 MMHG

## 2017-05-25 VITALS
RESPIRATION RATE: 16 BRPM | OXYGEN SATURATION: 98 % | DIASTOLIC BLOOD PRESSURE: 60 MMHG | SYSTOLIC BLOOD PRESSURE: 110 MMHG | HEART RATE: 97 BPM

## 2017-05-25 VITALS — RESPIRATION RATE: 26 BRPM | SYSTOLIC BLOOD PRESSURE: 139 MMHG | HEART RATE: 118 BPM | DIASTOLIC BLOOD PRESSURE: 82 MMHG

## 2017-05-25 VITALS
RESPIRATION RATE: 16 BRPM | SYSTOLIC BLOOD PRESSURE: 99 MMHG | OXYGEN SATURATION: 97 % | DIASTOLIC BLOOD PRESSURE: 58 MMHG | TEMPERATURE: 100 F | HEART RATE: 106 BPM

## 2017-05-25 VITALS — OXYGEN SATURATION: 94 %

## 2017-05-25 VITALS — HEART RATE: 103 BPM

## 2017-05-25 VITALS
OXYGEN SATURATION: 94 % | HEART RATE: 118 BPM | RESPIRATION RATE: 18 BRPM | DIASTOLIC BLOOD PRESSURE: 75 MMHG | SYSTOLIC BLOOD PRESSURE: 105 MMHG

## 2017-05-25 VITALS — OXYGEN SATURATION: 100 %

## 2017-05-25 VITALS — RESPIRATION RATE: 36 BRPM | HEART RATE: 118 BPM

## 2017-05-25 LAB
ALP SERPL-CCNC: 65 U/L (ref 45–117)
ALT SERPL-CCNC: 113 U/L (ref 10–53)
ANION GAP SERPL CALC-SCNC: 7 MEQ/L (ref 5–15)
AST SERPL-CCNC: 378 U/L (ref 15–37)
BASE EXCESS BLD CALC-SCNC: 0.6 MMOL/L (ref -2–2)
BASOPHILS # BLD AUTO: 0 TH/MM3 (ref 0–0.2)
BASOPHILS NFR BLD: 0.2 % (ref 0–2)
BENZODIAZEPINES PNL UR: 92 % (ref 90–100)
BILIRUB SERPL-MCNC: 1.1 MG/DL (ref 0.2–1)
BLOOD GAS CARBOXYHEMOGLOBIN: 2 % (ref 0–4)
BLOOD GAS HCO3: 25 MMOL/L (ref 22–26)
BLOOD GAS OXYGEN CONTENT: 12.7 VOL % (ref 12–20)
BLOOD GAS PCO2: 39 MMHG (ref 38–42)
BUN SERPL-MCNC: 9 MG/DL (ref 7–18)
CHLORIDE SERPL-SCNC: 104 MEQ/L (ref 98–107)
CRITICAL VALUE: NO
DRAW SITE: (no result)
EOSINOPHIL # BLD: 0.1 TH/MM3 (ref 0–0.4)
EOSINOPHIL NFR BLD: 1 % (ref 0–4)
ERYTHROCYTE [DISTWIDTH] IN BLOOD BY AUTOMATED COUNT: 12.2 % (ref 11.6–17.2)
GFR SERPLBLD BASED ON 1.73 SQ M-ARVRAT: 107 ML/MIN (ref 89–?)
HCO3 BLD-SCNC: 26.9 MEQ/L (ref 21–32)
HCT VFR BLD CALC: 32 % (ref 35–46)
HEMO FLAGS: (no result)
LYMPHOCYTES # BLD AUTO: 1.1 TH/MM3 (ref 1–4.8)
LYMPHOCYTES NFR BLD AUTO: 12.2 % (ref 9–44)
MAGNESIUM SERPL-MCNC: 2.3 MG/DL (ref 1.5–2.5)
MCH RBC QN AUTO: 30.1 PG (ref 27–34)
MCHC RBC AUTO-ENTMCNC: 34 % (ref 32–36)
MCV RBC AUTO: 88.6 FL (ref 80–100)
METHGB MFR BLDA: 1.6 % (ref 0–2)
MONOCYTES NFR BLD: 8.8 % (ref 0–8)
NEUTROPHILS # BLD AUTO: 7.2 TH/MM3 (ref 1.8–7.7)
NEUTROPHILS NFR BLD AUTO: 77.8 % (ref 16–70)
NUMBER OF ARTERIAL PUNCTURES: 1
O2/TOTAL GAS SETTING VFR VENT: 30 %
OXYGEN DEVICE: (no result)
PLATELET # BLD: 151 TH/MM3 (ref 150–450)
PO2 BLD: 77 MMHG (ref 61–120)
POTASSIUM SERPL-SCNC: 3.9 MEQ/L (ref 3.5–5.1)
RBC # BLD AUTO: 3.61 MIL/MM3 (ref 4–5.3)
SALICYLATES SERPL-MCNC: 9.7 G/DL (ref 12–16)
SODIUM SERPL-SCNC: 138 MEQ/L (ref 136–145)
STAT: NO
TEMP CORR TO: 98.6
ULNAR PULSE: PRESENT
VENT SETTINGS: (no result)
WBC # BLD AUTO: 9.3 TH/MM3 (ref 4–11)

## 2017-05-25 RX ADMIN — ACETAMINOPHEN PRN MG: 650 SOLUTION ORAL at 00:18

## 2017-05-25 RX ADMIN — HUMAN INSULIN SCH: 100 INJECTION, SOLUTION SUBCUTANEOUS at 06:00

## 2017-05-25 RX ADMIN — IPRATROPIUM BROMIDE AND ALBUTEROL SULFATE SCH AMPULE: .5; 3 SOLUTION RESPIRATORY (INHALATION) at 07:49

## 2017-05-25 RX ADMIN — PROPOFOL SCH MLS/HR: 10 INJECTION, EMULSION INTRAVENOUS at 03:46

## 2017-05-25 RX ADMIN — PANTOPRAZOLE SODIUM SCH MG: 40 INJECTION, POWDER, FOR SOLUTION INTRAVENOUS at 08:06

## 2017-05-25 RX ADMIN — TAZOBACTAM SODIUM AND PIPERACILLIN SODIUM SCH MLS/HR: 500; 4 INJECTION, SOLUTION INTRAVENOUS at 03:41

## 2017-05-25 RX ADMIN — TAZOBACTAM SODIUM AND PIPERACILLIN SODIUM SCH MLS/HR: 500; 4 INJECTION, SOLUTION INTRAVENOUS at 08:06

## 2017-05-25 RX ADMIN — STANDARDIZED SENNA CONCENTRATE AND DOCUSATE SODIUM SCH TAB: 8.6; 5 TABLET, FILM COATED ORAL at 08:06

## 2017-05-25 RX ADMIN — ACETAMINOPHEN PRN MG: 650 SOLUTION ORAL at 11:46

## 2017-05-25 RX ADMIN — IPRATROPIUM BROMIDE AND ALBUTEROL SULFATE SCH AMPULE: .5; 3 SOLUTION RESPIRATORY (INHALATION) at 03:18

## 2017-05-25 NOTE — PD.CONS
Provisional Diagnosis


Admission Date


May 24, 2017 at 12:43


Axis I.


Polysubstance dependence, including benzodiazepines, cocaine, alcohol, opiates, 

history of bipolar disorder,


Axis II.


Deferred


Axis III.


No significant medical history


Axis IV.


History of polysubstance dependence, incarcerations, recently released from 

half-way last week and


Axis V.


55





History of Present Illness


Service


Psychiatry


Consult Requested By





Primary Care Physician





HPI


The patient is a 27-year-old  woman, domiciled with david in [place], unemployed, supported by [employer], multiple incarcerations, recently 

released from half-way last week, psychiatric history of self reported bipolar 

disorder, polysubstance dependence, including cocaine, marijuana, opiates, 

alcohol, benzodiazepines, she was recently started on methadone 40 mg daily, 

she denies psychiatric hospitalizations, she denies previous suicidal attempts, 

she is on Risperdal 1 mg twice a day and lithium 300 mg twice a day started by 

psychiatrist in [facility]-way "for impulse control", no significant medical history, who 

presents via EMS for suspected overdose.  The patient was last seen normal at 

the methadone clinic at 6 AM.  When paramedics arrived they found the patient 

unconscious with a respiratory rate of 4.  She had pinpoint pupils and appeared 

to have aspirated.  No further history could be obtained.  The patient was 

noted to have multiple track marks on her upper extremities.  Patient was 

intubated and admitted in the ICU.  Consulted to psychiatry to assess potential 

suicidal attempt.  When patient was extubated in the unit patient became 

agitated and was requesting to leave the hospital and signed AMA.  Primary 

medical care team brought the concern of capacity of the patient to signed AMA.

  On psychiatric evaluation today patient is found in her room in the ICU, 

sedated, sleeping, she is accompanied by jagsuly.  Patient is easily arousable, 

and once awake is able to engage in the psychiatric assessment.  Patient 

clarifies that she is not here for a suicidal attempt.  She did not overdose 

with suicidal intentions.  She says that she uses cocaine and benzodiazepines 

yesterday.  She says that she uses Valium 10 mg, which is unable to clarify 

what is she getting this medication from.  She says that she is also in lithium 

300 mg twice a day and Risperdal 1 g twice a day prescribing half-way for impulse 

control.  She is started last Monday a methadone program, she took her regular 

40 mg this morning and after that the next time she opened her eyes she was 

here in the hospital.  She denies depressive symptoms, she denies hopelessness, 

she denies helplessness, denies anhedonia, she denies anxiety, she denies 

suicidal and homicidal ideation, she denies visual and auditory hallucinations.

  Patient is oriented 3, she is able to verbalize understanding of her medical 

condition and consequences of leaving AMA.  No cognitive deficit are present 

during this evaluation.  Patient reports daily use of cocaine, marijuana, 

Dilaudid, and sometimes alcohol.  She clarifies that she is being in half-way 

multiple times for drug possession and assault.  Her fianc at bedside, states 

that he does not think that the patient overdosed with suicidal intentions.  

She says that since the patient got out half-way she has been very sedated, and 

groggy.  She has not reported any suicidal intentions.  He says that he things 

that her problem is that she is mixing too many drugs.  He does not think that 

a psychiatric admission would help her in anyway.  She describes the patient as 

a very aggressive and impulsive person, actually she is spent 3 months in half-way 

for assaulting him, he says he is giving her a second opportunity.





Review of Systems


Constitutional:  DENIES: Diaphoretic episodes, Fatigue, Fever, Weight gain, 

Weight loss, Chills, Dizziness, Change in appetite, Night Sweats


Endocrine:  DENIES: Abnorml menstrual pattern, Heat/cold intolerance, Polydipsia

, Polyuria, Polyphagia


Eyes:  DENIES: Blurred vision, Diplopia, Eye inflammation, Eye pain, Vision loss

, Photosensitivity, Double Vision


Ears, nose, mouth, throat:  DENIES: Tinnitus, Hearing loss, Vertigo, Nasal 

discharge, Oral lesions, Throat pain, Hoarseness, Ear Pain, Running Nose, 

Epistaxis, Sinus Pain, Toothache, Odynophagia


Respiratory:  DENIES: Apneas, Cough, Snoring, Wheezing, Hemoptysis, Sputum 

production, Shortness of breath


Cardiovascular:  DENIES: Chest pain, Palpitations, Syncope, Dyspnea on Exertion

, PND, Lower Extremity Edema, Orthopnea, Claudication


Gastrointestinal:  DENIES: Abdominal pain, Black stools, Bloody stools, 

Constipation, Diarrhea, Nausea, Vomiting, Difficulty Swallowing, Anorexia


Genitourinary:  DENIES: Abnormal vaginal bleeding, Dysmenorrhea, Dyspareunia, 

Sexual dysfunction, Urinary frequency, Urinary incontinence, Urgency, Hematuria

, Dysuria, Nocturia, Vaginal discharge


Integumentary:  DENIES: Abnormal pigmentation, Pruritus, Rash, Nail changes, 

Breast masses, Breast skin changes, Nipple discharge


Hematologic/lymphatic:  DENIES: Bruising, Lymphadenopathy


Immunologic/allergic:  DENIES: Eczema, Urticaria


Psychiatric:  DENIES: Anxiety, Confusion, Mood changes, Depression, 

Hallucinations, Agitation, Suicidal Ideation, Homicidal Ideation, Delusions





Past Family Social History


Coded Allergies:  


     *MDRO Multi-Drug Resistant Organism (Verified  Adverse Reaction, Unknown, 2 /17/17)


 MRSA (arm wound) - 10/2015


 MRSA PCR Screen NEGATIVE - 3/24/2016 & 7/28/16


 ** Cleared per Infection Control **


Active Scripts


Mupirocin Topical (Bactroban Topical)2% Oint1 Appl TOPICAL BID  10 Days  Ref 0


   Prov:Jose Carlos Macedo MD         2/17/17


Sulfamethoxazole-Trimethoprim (Bactrim DS)800-160 Mg Tab1 Tab PO BID  #14 TAB  

Ref 0


   Prov:Jose Carlos Macedo MD         2/17/17


Reported Medications


Lithium Carbonate 300 Mg Chp397 Mg PO BID   Ref 0


   5/24/17


Risperidone 3 Mg Tab3 Mg PO Q12HR  #60 TAB  Ref 0


   5/24/17


Mirtazapine 15 Mg Tab15 Mg PO HS  #30 TAB  Ref 0


   5/24/17


Family History


She denies psychiatric family history


Social History


Patient was born and raised in [place], she lives in [place] with [facility], she 

is unemployed, supported by [employer], has no kids, her highest level of education 

is ninth grade


Patient's Strengths (min. 2)


Verbal communication, she is in a methadone program





Physical Exam


On physical examination processes to be sedated, hypoactive, but no tremors, no 

withdrawal, no EPS present


Vital Signs





 Vital Signs








  Date Time  Temp Pulse Resp B/P Pulse Ox O2 Delivery O2 Flow Rate FiO2


 


5/25/17 10:49     94 Nasal Cannula 5.00 


 


5/25/17 08:20        30


 


5/25/17 06:00  103      


 


5/25/17 04:00 100.8  16 110/64    








 I/O








 5/24/17 5/24/17 5/25/17





 08:00 16:00 00:00


 


Intake Total   856 ml


 


Output Total  500 ml 500 ml


 


Balance  -500 ml 356 ml








Lab Results


Laboratory Tests


Test   5/24/17 5/24/17 5/24/17 5/24/17


   10:20   10:30   11:32   12:39


White Blood Count   8.2 TH/MM3         


Red Blood Count   3.82 MIL/MM3         


Hemoglobin   11.6 GM/DL         


Hematocrit   34.4 %         


Mean Corpuscular Volume   90.0 FL         


Mean Corpuscular Hemoglobin   30.4 PG         


Mean Corpuscular Hemoglobin   33.7 %         


Concent            


Red Cell Distribution Width   12.1 %         


Platelet Count   222 TH/MM3         


Mean Platelet Volume   7.7 FL         


Neutrophils (%) (Auto)   68.6 %         


Lymphocytes (%) (Auto)   23.9 %         


Monocytes (%) (Auto)   6.4 %         


Eosinophils (%) (Auto)   0.9 %         


Basophils (%) (Auto)   0.2 %         


Neutrophils # (Auto)   5.6 TH/MM3         


Lymphocytes # (Auto)   2.0 TH/MM3         


Monocytes # (Auto)   0.5 TH/MM3         


Eosinophils # (Auto)   0.1 TH/MM3         


Basophils # (Auto)   0.0 TH/MM3         


CBC Comment   DIFF FINAL          


Differential Comment             


Sodium Level   139 MEQ/L         


Potassium Level   3.7 MEQ/L         


Chloride Level   100 MEQ/L         


Carbon Dioxide Level   26.6 MEQ/L         


Anion Gap   12 MEQ/L         


Blood Urea Nitrogen   18 MG/DL         


Creatinine   1.29 MG/DL         


Estimat Glomerular Filtration   36 ML/MIN         


Rate            


Random Glucose   340 MG/DL         


Calcium Level   8.2 MG/DL         


Human Chorionic Gonadotropin,   LESS THAN 1         


Quant   MIU/ML         


Salicylates Level   LESS THAN 1.7         


   MG/DL         


Acetaminophen Level   LESS THAN 2.0         


   MCG/ML         


Ethyl Alcohol Level   LESS THAN 3         


   MG/DL         


Urine Color      YELLOW       


Urine Turbidity      CLEAR       


Urine pH      6.5       


Urine Specific Gravity      1.019       


Urine Protein      30 mg/dL      


Urine Glucose (UA)      300 mg/dL      


Urine Ketones      NEG mg/dL      


Urine Occult Blood      LARGE       


Urine Nitrite      NEG       


Urine Bilirubin      NEG       


Urine Urobilinogen      LESS THAN 2.0      


      MG/DL      


Urine Leukocyte Esterase      NEG       


Urine RBC      1 /hpf      


Urine WBC      8 /hpf      


Urine Squamous Epithelial      <1 /hpf      


Cells            


Urine Transitional Epithelial      <1 /hpf      


Cells            


Urine Bacteria      RARE /hpf      


Urine Hyaline Casts      1 /lpf      


Urine Granular Casts      1 /lpf      


Urine Mucus      FEW /lpf      


Microscopic Urinalysis Comment      CULT NOT      


      INDICATED      


Urine Opiates Screen      NEG       


Urine Barbiturates Screen      NEG       


Urine Amphetamines Screen      NEG       


Urine Benzodiazepines Screen      POS       


Urine Cocaine Screen      POS       


Urine Cannabinoids Screen      NEG       


Blood Gas Puncture Site         RT RADIAL    


Blood Gas Patient Temperature         98.6    


Blood Gas HCO3         26 mmol/L   


Blood Gas Base Excess         0.9 mmol/L   


Blood Gas Oxygen Saturation         95 %   


Arterial Blood pH         7.34    


Arterial Blood Partial         49 mmHg   


Pressure CO2            


Arterial Blood Partial         99 mmHG   


Pressure O2            


Arterial Blood Oxygen Content         15.7 Vol %   


Arterial Blood         2.2 %   


Carboxyhemoglobin            


Arterial Blood Methemoglobin         0.8 %   


Blood Gas Hemoglobin         11.7 G/DL   


Oxygen Delivery Device         VENTILATOR    


Blood Gas Ventilator Setting         450/16/PEEP5/1.O   


            


Blood Gas Inspired Oxygen         60 %   


Lithium Level            0.6 MEQ/L


            


Test   5/24/17 5/25/17      


   14:50   04:17      


Nasal Screen MRSA (PCR)   MRSA NOT         


   DETECTED         


White Blood Count      9.3 TH/MM3      


Red Blood Count      3.61 MIL/MM3      


Hemoglobin      10.9 GM/DL      


Hematocrit      32.0 %      


Mean Corpuscular Volume      88.6 FL      


Mean Corpuscular Hemoglobin      30.1 PG      


Mean Corpuscular Hemoglobin      34.0 %      


Concent            


Red Cell Distribution Width      12.2 %      


Platelet Count      151 TH/MM3      


Mean Platelet Volume      7.9 FL      


Neutrophils (%) (Auto)      77.8 %      


Lymphocytes (%) (Auto)      12.2 %      


Monocytes (%) (Auto)      8.8 %      


Eosinophils (%) (Auto)      1.0 %      


Basophils (%) (Auto)      0.2 %      


Neutrophils # (Auto)      7.2 TH/MM3      


Lymphocytes # (Auto)      1.1 TH/MM3      


Monocytes # (Auto)      0.8 TH/MM3      


Eosinophils # (Auto)      0.1 TH/MM3      


Basophils # (Auto)      0.0 TH/MM3      


CBC Comment      DIFF FINAL       


Differential Comment             


Imaging


Last Impressions


Head CT 5/24/17 1019 Signed


Impressions: 


 Service Date/Time:  Wednesday, May 24, 2017 12:51 - CONCLUSION:  No acute 


 intracranial disease.       George Ventura MD 


Chest X-Ray 5/24/17 1019 Signed


Impressions: 


 Service Date/Time:  Wednesday, May 24, 2017 10:26 - CONCLUSION:  1. 

Endotracheal 


 tube tip 1 cm above the aurelia. 2. Lungs are clear.     George Ventura MD





Mental Status Examination


Appearance


 woman, short stature, disheveled, poor hygiene, she seems to be 

sedated, but cooperative


Speech:  Unremarkable


Orientation:  x3


Memory:  Unremarkable


Thought Process:  Logical


Thought Content:  Unremarkable


Language


Fluid and spontaneous


Fund of Knowledge


Adequate for level of education


Hallucination Type:  None


Attention and Concentration:  Good


Suicidal Ideation:  No


Previous Suicide Attempts:  No


Homicidal Ideation:  No


Previous Homicide Attempts:  No


Judgment:  Poor


Affect:  Irritable


Mood:  Angry


Motor Activity:  Normal gait





Assessment & Plan


Problem List:  


(1) Polysubstance abuse


Assessment & Plan:  On psychiatric evaluation today patient is sedated, groggy, 

but cooperative.  She denies depressive symptoms, she denies anxiety, she 

denies [place] and psychosis.  She denies suicidal and homicidal ideation, she 

denies visual and auditory hallucinations.  Patient is logical, coherent and 

relevant, oriented 3, without attention deficit, without any gross cognitive 

impairment present.  On the ICU patient has been hostile, at times agitated and 

aggressive, requesting to sign AMA.  However, during my evaluation patient 

agrees to stay in the hospital, but seems to be very focus in pain medication.  

She does not meet criteria for psychiatric admission at this moment, I did not 

see any reason to Phillips act the patient.  Patient would really benefit of a 

comprehensive inpatient rehabilitation program.  She is already engaged in a 

methadone program.  I recommended to continue her methadone program and try to 

avoid illegal drugs.  Her recent aggressive behavior, hostility and her poor 

judgment are actually driven by her increased necessity and craving of 

psychoactive substances.  But, is also very important to clarify the patient 

has multiple factor in her psychiatric and social history that suggest an 

underlying personality pathology, most probably in the cluster B group.  The 

patient does not have any psychiatric contraindication to continue her medical 

treatment as needed, and she has decision-making capacity to sign AMA at this 

moment.  I do not have any objection in continuing her lithium 300 mg twice a 

day and her Risperdal 1 mg twice a day for impulse control.  Consult 

appreciated.


ICD Code:  F19.10


Assessment & Plan


Estimated LOS:  Matheus Kruger MD May 25, 2017 14:15

## 2017-05-25 NOTE — HHI.CCPN
Subjective


Remarks/Hospital Course


The patient appears to be in her mid 20s.  She presented to [facility] ED via EMS for suspected drug overdose.  She was last seen normal at the


methadone clinic at 6:00 a.m. and when the paramedics arrived they found her 

unconscious with a respiratory rate of 4.  She had pinpoint pupils and appears


to have aspirated.  She was noted to have multiple track marks on her upper 

extremities.  On arrival to the ER she was tachycardic and hypertensive.  The 

patient was intubated with etomidate and succinylcholine and placed on full 

mechanical ventilation.  She was given Narcan 2 mg IV with minimal response. 

ABG post intubation showed a pH of 7.34, CO2 49, PAO2 99, bicarb 26, sats of 95

% on PRVC mode, rate of 16, tidal volume 450, PEEP of 5, FIO2 60%.  In the ER


she was given one liter of normal saline and placed on Diprivan infusion for 

sedation.  Chest x-ray post intubation showed ET tube 1 cm above the aurelia,


otherwise clear lungs.  The patient also had a CT scan of the brain which 

showed no acute intracranial disease.  Per ED records the patient takes lithium 

and her lithium level is pending.





5/25 Patient is sedated with Diprivan and intubated . T: 101.3 at midnight.





Objective





 Vital Signs








  Date Time  Temp Pulse Resp B/P Pulse Ox O2 Delivery O2 Flow Rate FiO2


 


5/25/17 06:00  103      


 


5/25/17 04:03     98   30


 


5/25/17 04:00 100.8  16 110/64    


 


5/24/17 12:00      Ventilator  








 Intake and Output








 5/24/17 5/24/17 5/25/17





 08:00 16:00 00:00


 


Intake Total   856 ml


 


Output Total  500 ml 500 ml


 


Balance  -500 ml 356 ml








Result Diagram:  


5/25/17 0417                                                                   

             5/24/17 1020





Other Results





 Laboratory Tests








Test 5/24/17 5/24/17 5/24/17 5/24/17





 10:20 10:30 11:32 12:39


 


White Blood Count 8.2 TH/MM3   


 


Red Blood Count 3.82 MIL/MM3   


 


Hemoglobin 11.6 GM/DL   


 


Hematocrit 34.4 %   


 


Mean Corpuscular Volume 90.0 FL   


 


Mean Corpuscular Hemoglobin 30.4 PG   


 


Mean Corpuscular Hemoglobin 33.7 %   





Concent    


 


Red Cell Distribution Width 12.1 %   


 


Platelet Count 222 TH/MM3   


 


Mean Platelet Volume 7.7 FL   


 


Neutrophils (%) (Auto) 68.6 %   


 


Lymphocytes (%) (Auto) 23.9 %   


 


Monocytes (%) (Auto) 6.4 %   


 


Eosinophils (%) (Auto) 0.9 %   


 


Basophils (%) (Auto) 0.2 %   


 


Neutrophils # (Auto) 5.6 TH/MM3   


 


Lymphocytes # (Auto) 2.0 TH/MM3   


 


Monocytes # (Auto) 0.5 TH/MM3   


 


Eosinophils # (Auto) 0.1 TH/MM3   


 


Basophils # (Auto) 0.0 TH/MM3   


 


CBC Comment DIFF FINAL    


 


Differential Comment     


 


Sodium Level 139 MEQ/L   


 


Potassium Level 3.7 MEQ/L   


 


Chloride Level 100 MEQ/L   


 


Carbon Dioxide Level 26.6 MEQ/L   


 


Anion Gap 12 MEQ/L   


 


Blood Urea Nitrogen 18 MG/DL   


 


Creatinine 1.29 MG/DL   


 


Estimat Glomerular Filtration 36 ML/MIN   





Rate    


 


Random Glucose 340 MG/DL   


 


Calcium Level 8.2 MG/DL   


 


Human Chorionic Gonadotropin, LESS THAN 1   





Quant MIU/ML   


 


Salicylates Level LESS THAN 1.7   





 MG/DL   


 


Acetaminophen Level LESS THAN 2.0   





 MCG/ML   


 


Ethyl Alcohol Level LESS THAN 3   





 MG/DL   


 


Urine Color  YELLOW   


 


Urine Turbidity  CLEAR   


 


Urine pH  6.5   


 


Urine Specific Gravity  1.019   


 


Urine Protein  30 mg/dL  


 


Urine Glucose (UA)  300 mg/dL  


 


Urine Ketones  NEG mg/dL  


 


Urine Occult Blood  LARGE   


 


Urine Nitrite  NEG   


 


Urine Bilirubin  NEG   


 


Urine Urobilinogen  LESS THAN 2.0  





  MG/DL  


 


Urine Leukocyte Esterase  NEG   


 


Urine RBC  1 /hpf  


 


Urine WBC  8 /hpf  


 


Urine Squamous Epithelial  <1 /hpf  





Cells    


 


Urine Transitional Epithelial  <1 /hpf  





Cells    


 


Urine Bacteria  RARE /hpf  


 


Urine Hyaline Casts  1 /lpf  


 


Urine Granular Casts  1 /lpf  


 


Urine Mucus  FEW /lpf  


 


Microscopic Urinalysis Comment  CULT NOT  





  INDICATED  


 


Urine Opiates Screen  NEG   


 


Urine Barbiturates Screen  NEG   


 


Urine Amphetamines Screen  NEG   


 


Urine Benzodiazepines Screen  POS   


 


Urine Cocaine Screen  POS   


 


Urine Cannabinoids Screen  NEG   


 


Blood Gas Puncture Site   RT RADIAL  


 


Blood Gas Patient Temperature   98.6  


 


Blood Gas HCO3   26 mmol/L 


 


Blood Gas Base Excess   0.9 mmol/L 


 


Blood Gas Oxygen Saturation   95 % 


 


Arterial Blood pH   7.34  


 


Arterial Blood Partial   49 mmHg 





Pressure CO2    


 


Arterial Blood Partial   99 mmHG 





Pressure O2    


 


Arterial Blood Oxygen Content   15.7 Vol % 


 


Arterial Blood   2.2 % 





Carboxyhemoglobin    


 


Arterial Blood Methemoglobin   0.8 % 


 


Blood Gas Hemoglobin   11.7 G/DL 


 


Oxygen Delivery Device   VENTILATOR  


 


Blood Gas Ventilator Setting   450/16/PEEP5/1.O 





    


 


Blood Gas Inspired Oxygen   60 % 


 


Lithium Level    0.6 MEQ/L


 


    





Test 5/24/17 5/25/17  





 14:50 04:17  


 


Nasal Screen MRSA (PCR) MRSA NOT   





 DETECTED   


 


White Blood Count  9.3 TH/MM3  


 


Red Blood Count  3.61 MIL/MM3  


 


Hemoglobin  10.9 GM/DL  


 


Hematocrit  32.0 %  


 


Mean Corpuscular Volume  88.6 FL  


 


Mean Corpuscular Hemoglobin  30.1 PG  


 


Mean Corpuscular Hemoglobin  34.0 %  





Concent    


 


Red Cell Distribution Width  12.2 %  


 


Platelet Count  151 TH/MM3  


 


Mean Platelet Volume  7.9 FL  


 


Neutrophils (%) (Auto)  77.8 %  


 


Lymphocytes (%) (Auto)  12.2 %  


 


Monocytes (%) (Auto)  8.8 %  


 


Eosinophils (%) (Auto)  1.0 %  


 


Basophils (%) (Auto)  0.2 %  


 


Neutrophils # (Auto)  7.2 TH/MM3  


 


Lymphocytes # (Auto)  1.1 TH/MM3  


 


Monocytes # (Auto)  0.8 TH/MM3  


 


Eosinophils # (Auto)  0.1 TH/MM3  


 


Basophils # (Auto)  0.0 TH/MM3  


 


CBC Comment  DIFF FINAL   


 


Differential Comment     








Imaging





Last Impressions








Head CT 5/24/17 1019 Signed





Impressions: 





 Service Date/Time:  Wednesday, May 24, 2017 12:51 - CONCLUSION:  No acute 





 intracranial disease.       George Ventura MD 


 


Chest X-Ray 5/24/17 1019 Signed





Impressions: 





 Service Date/Time:  Wednesday, May 24, 2017 10:26 - CONCLUSION:  1. 

Endotracheal 





 tube tip 1 cm above the aurelia. 2. Lungs are clear.     George Ventura MD 








Objective Remarks


GENERAL: Patient is 26 yo intubated and sedated


SKIN: Warm and dry.


HEAD: Normocephalic.


EYES: No scleral icterus. No injection or drainage. 


NECK: Supple, trachea midline. No JVD or lymphadenopathy.


CARDIOVASCULAR: Regular rate and rhythm without murmurs, gallops, or rubs. 


RESPIRATORY: Breath sounds equal bilaterally. No accessory muscle use.


GASTROINTESTINAL: Abdomen soft, non-tender, nondistended. 


MUSCULOSKELETAL: No cyanosis, or edema. 


Neuro: Sedated








A/P


Assessment and Plan


1. VDRF.


2. Altered mental status.


3. Polysubstance abuse with urine drug screen positive for cocaine and benzo


4. Mild Acute kidney injury.


5. Hyperglycemia.


 


PLAN





Neuro; On Diprivan infusion for sedation and daily sedation vacation.


         Monitor neuro status closely. CT brain showed no acute intracranial 

abnormalities.  


         Urine drug screen positive for benzo and cocaine.  Lithium level: 0.6


       


Pulm: Continue with vent support and maintain sats above 92%.


        Bronchodilators , ICU vent bundle. Start SBT and possible extubation 

today


 


CV: Monitor HR and BP and maintain MAP >65 mmHg.  


      On NS at 84 mL an hour.





: Monitor renal function, I's and O's, and electrolyte replacement per 

protocol.  Continue with IV fluids 





GI : On Protonix 40 mg IV daily for GI prophylaxis.


     Start tube feeds today if remains intubated.





ID: Continue with abx ( Zosyn) for possible aspiration, give Vanco 1gram x1. 

Monitor for signs of infections( fever and WBC).  


     Pancultured 5/24 . CXR post intubation showed clear lungs 





Endo: On medium scale sliding scale insulin with Accu-Cheks q.6h. for glycemic 

control.





GI prophylaxis with Protonix 40 mg daily and DVT prophylaxis with SCDs/Heparin 

SQ





Follow up on labs





Addendum: Patient was extubated and is on 5L N.C she was seen by psych and 

declared patient is competent to make deceptions and there is no reason to 

baker act her.


                She wants to leave AMA risks of levaing AMA explained to 

patient which include resp failure and even death.





Level 3.








Eliz Morfin MD May 25, 2017 07:53

## 2017-07-13 ENCOUNTER — HOSPITAL ENCOUNTER (EMERGENCY)
Dept: HOSPITAL 17 - PHED | Age: 27
Discharge: LEFT BEFORE DISCHARGE | End: 2017-07-13
Payer: SELF-PAY

## 2017-07-13 VITALS
DIASTOLIC BLOOD PRESSURE: 96 MMHG | OXYGEN SATURATION: 99 % | RESPIRATION RATE: 20 BRPM | TEMPERATURE: 98.6 F | SYSTOLIC BLOOD PRESSURE: 137 MMHG | HEART RATE: 120 BPM

## 2017-07-13 VITALS — HEIGHT: 58 IN | WEIGHT: 110.23 LBS | BODY MASS INDEX: 23.14 KG/M2

## 2017-07-13 DIAGNOSIS — A41.9: Primary | ICD-10-CM

## 2017-07-13 PROCEDURE — 99281 EMR DPT VST MAYX REQ PHY/QHP: CPT

## 2017-07-13 NOTE — PD
HPI


Chief Complaint:  left foot pain


Time Seen by Provider:  21:44


Travel History


International Travel<30 days:  No


Contact w/Intl Traveler<30days:  No


Traveled to known affect area:  No





History of Present Illness


HPI


This 27-year-old female has swelling and pain in the left foot.  She has a 

history of IV drug abuse and admits that she has injected in this area.  She 

says that she does not want to be admitted to the hospital here but wishes for 

us to give her antibiotics so that she can go to another hospital.  She says she

's been going to [facility] and get in there.  Patient has a history of IV 

drug abuse.  She was admitted to the [facility] hospital at the end of May after an 

overdose and signed out against advice.  Patient has multiple sores over her 

body.  On examining the left foot there is erythema and swelling of the left 

ankle.  It is tender both medially and laterally.  I did not do a complete 

examination of the patient but I did tell her that a dose of antibiotics would 

not be in her best interest.  I explained to her that she may well have a 

septic joint which may require surgery and she needed full evaluation including 

blood work and cultures prior to antibiotics.  The patient is adamant that she 

does not want to be admitted here.  She says she will go to another [facility]..  She'll be asked to sign against advice.  I have advised her that a 

septic joint can result in severe loss of function.  Patient is awake and alert 

and appears to understand the consequences of her actions.  She was seen by a 

psychiatrist in May and deemed competent at that time.  Patient did not have a 

comprehensive evaluation because she indicated she would not stay here





PFSH


Past Medical History


Hx Anticoagulant Therapy:  No


ADHD:  Yes


Asthma:  Yes


Autoimmune Disease:  No


Anxiety:  Yes


Depression:  No


Cancer:  No


Cardiovascular Problems:  No


Chemotherapy:  No


Chest Pain:  Yes


Congestive Heart Failure:  No


Cerebrovascular Accident:  No


Diabetes:  No


Diminished Hearing:  No


Gastrointestinal Disorders:  Yes


Genitourinary:  No


Hypertension:  No


Immune Disorder:  No


Implanted Vascular Access Dvce:  No


Musculoskeletal:  No


Neurologic:  No


Psychiatric:  Yes (BIPOLAR DISOREDER)


Respiratory:  No


Immunizations Current:  Yes


Thyroid Disease:  Yes


Ulcer:  No


:  3


Para:  2


Miscarriage:  1





Past Surgical History


Appendectomy:  No


Body Medical Devices:  BORDERLINE PERSONALITY; POLYSUBSTANCE ABUSE; ALCOHOL 

ABUSE; DEFIANT DISORDE


 Section:  Yes (X2)


Cholecystectomy:  No


Gynecologic Surgery:  Yes (C SECTIONS x2)


Hysterectomy:  No


Other Surgery:  Yes (LEFT FOREARM SURGERY)





Social History


Alcohol Use:  No (UNKNOWN )


Tobacco Use:  No


Substance Use:  Yes (UNKNOWN )





Allergies-Medications


(Allergen,Severity, Reaction):  


Coded Allergies:  


     *MDRO Multi-Drug Resistant Organism (Verified  Adverse Reaction, Unknown, )


 MRSA (arm wound) - 10/2015


 MRSA PCR Screen NEGATIVE - 3/24/2016 & 16


 ** Cleared per Infection Control **


Reported Meds & Prescriptions





Reported Meds & Active Scripts


Active


Bactroban Topical (Mupirocin) 2% Oint 1 Appl TOPICAL BID 10 Days


Bactrim DS (Sulfamethoxazole-Trimethoprim) 800-160 Mg Tab 1 Tab PO BID


Reported


Lithium Carbonate 300 Mg Cap 300 Mg PO BID


Risperidone 3 Mg Tab 3 Mg PO Q12HR


Mirtazapine 15 Mg Tab 15 Mg PO HS








Review of Systems


ROS Limitations:  Other: (patient left AMA)





Physical Exam


Narrative


There is erythema and swelling of the ankle.  She has multiple scabs all over 

her body from IV injection sites.  Patient is awake alert and orientated 

oriented.  She is coherent and capable of making decisions





Data


Data


Last Documented VS





Vital Signs








  Date Time  Temp Pulse Resp B/P Pulse Ox O2 Delivery O2 Flow Rate FiO2


 


17 22:18  118 18     


 


17 21:50      Room Air  


 


17 21:38 98.6   137/96 99   











MDM


Medical Decision Making


Medical Screen Exam Complete:  Yes


Emergency Medical Condition:  Yes


Medical Record Reviewed:  Yes


Differential Diagnosis


Differential includes cellulitis, septic arthritis, sepsis,


Narrative Course


Patient and family are hoping to get oral antibiotics on her weight.  I have 

told her this would not be in her best interest he needed a comprehensive 

evaluation slightly intravenous antibiotics.  Patient does not want to stay 

here and says she will go directly to another hospital emergency department





Diagnosis





 Primary Impression:  


 Sepsis


Disposition:  07 AGAINST MEDICAL ADVICE


Condition:  Serious








Ifeanyi Quispe MD 2017 21:59

## 2018-01-22 ENCOUNTER — HOSPITAL ENCOUNTER (EMERGENCY)
Dept: HOSPITAL 17 - NEPD | Age: 28
LOS: 1 days | Discharge: HOME | End: 2018-01-23
Payer: COMMERCIAL

## 2018-01-22 VITALS — BODY MASS INDEX: 17.72 KG/M2 | WEIGHT: 110.23 LBS | HEIGHT: 66 IN

## 2018-01-22 VITALS
HEART RATE: 106 BPM | OXYGEN SATURATION: 98 % | RESPIRATION RATE: 16 BRPM | DIASTOLIC BLOOD PRESSURE: 66 MMHG | SYSTOLIC BLOOD PRESSURE: 106 MMHG

## 2018-01-22 VITALS
TEMPERATURE: 98 F | SYSTOLIC BLOOD PRESSURE: 110 MMHG | HEART RATE: 98 BPM | RESPIRATION RATE: 18 BRPM | DIASTOLIC BLOOD PRESSURE: 67 MMHG | OXYGEN SATURATION: 97 %

## 2018-01-22 VITALS — TEMPERATURE: 98.1 F

## 2018-01-22 DIAGNOSIS — F14.10: Primary | ICD-10-CM

## 2018-01-22 DIAGNOSIS — E87.6: ICD-10-CM

## 2018-01-22 DIAGNOSIS — Z79.899: ICD-10-CM

## 2018-01-22 DIAGNOSIS — F10.10: ICD-10-CM
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
## 2018-01-22 LAB
ALBUMIN SERPL-MCNC: 3.6 GM/DL (ref 3.4–5)
ALP SERPL-CCNC: 116 U/L (ref 45–117)
ALT SERPL-CCNC: 43 U/L (ref 10–53)
APAP SERPL-MCNC: (no result) MCG/ML (ref 10–30)
AST SERPL-CCNC: 40 U/L (ref 15–37)
BASOPHILS # BLD AUTO: 0 TH/MM3 (ref 0–0.2)
BASOPHILS NFR BLD: 0.4 % (ref 0–2)
BILIRUB SERPL-MCNC: 0.3 MG/DL (ref 0.2–1)
BUN SERPL-MCNC: 10 MG/DL (ref 7–18)
CALCIUM SERPL-MCNC: 8.5 MG/DL (ref 8.5–10.1)
CHLORIDE SERPL-SCNC: 107 MEQ/L (ref 98–107)
CREAT SERPL-MCNC: 0.64 MG/DL (ref 0.5–1)
EOSINOPHIL # BLD: 0 TH/MM3 (ref 0–0.4)
EOSINOPHIL NFR BLD: 0.2 % (ref 0–4)
ERYTHROCYTE [DISTWIDTH] IN BLOOD BY AUTOMATED COUNT: 15 % (ref 11.6–17.2)
GFR SERPLBLD BASED ON 1.73 SQ M-ARVRAT: 110 ML/MIN (ref 89–?)
GLUCOSE SERPL-MCNC: 111 MG/DL (ref 74–106)
HCO3 BLD-SCNC: 23.5 MEQ/L (ref 21–32)
HCT VFR BLD CALC: 38.8 % (ref 35–46)
HGB BLD-MCNC: 13 GM/DL (ref 11.6–15.3)
LYMPHOCYTES # BLD AUTO: 1.9 TH/MM3 (ref 1–4.8)
LYMPHOCYTES NFR BLD AUTO: 14.5 % (ref 9–44)
MCH RBC QN AUTO: 27.6 PG (ref 27–34)
MCHC RBC AUTO-ENTMCNC: 33.5 % (ref 32–36)
MCV RBC AUTO: 82.2 FL (ref 80–100)
MONOCYTE #: 0.6 TH/MM3 (ref 0–0.9)
MONOCYTES NFR BLD: 4.7 % (ref 0–8)
NEUTROPHILS # BLD AUTO: 10.3 TH/MM3 (ref 1.8–7.7)
NEUTROPHILS NFR BLD AUTO: 80.2 % (ref 16–70)
PLATELET # BLD: 316 TH/MM3 (ref 150–450)
PMV BLD AUTO: 7 FL (ref 7–11)
PROT SERPL-MCNC: 8.2 GM/DL (ref 6.4–8.2)
RBC # BLD AUTO: 4.72 MIL/MM3 (ref 4–5.3)
SODIUM SERPL-SCNC: 139 MEQ/L (ref 136–145)
WBC # BLD AUTO: 12.8 TH/MM3 (ref 4–11)

## 2018-01-22 PROCEDURE — 80053 COMPREHEN METABOLIC PANEL: CPT

## 2018-01-22 PROCEDURE — 80307 DRUG TEST PRSMV CHEM ANLYZR: CPT

## 2018-01-22 PROCEDURE — 85025 COMPLETE CBC W/AUTO DIFF WBC: CPT

## 2018-01-22 PROCEDURE — 84702 CHORIONIC GONADOTROPIN TEST: CPT

## 2018-01-22 PROCEDURE — 99284 EMERGENCY DEPT VISIT MOD MDM: CPT

## 2018-01-22 PROCEDURE — 93005 ELECTROCARDIOGRAM TRACING: CPT

## 2018-01-22 NOTE — PD
HPI


Chief Complaint:  Alcohol/Drug Intoxication


Time Seen by Provider:  17:47


Travel History


International Travel<30 days:  No


Contact w/Intl Traveler<30days:  No


Traveled to known affect area:  No





History of Present Illness


HPI


28yo F with PMH of polysubstance abuse here under baker act for "taking several 

pills along with her alcohol addiction" and a danger to herself.  Pt admits to 

drinking alcohol but denies taking any pills.  Pt denies any fever, chest pain, 

sob, n/v, abdominal pain, focal weakness or numbness, or fall.  Pt just wants 

to sleep and shut the light off.





PFSH


Past Medical History


Hx Anticoagulant Therapy:  No


ADHD:  Yes


Asthma:  Yes


Autoimmune Disease:  No


Anxiety:  Yes


Depression:  No


Cancer:  No


Cardiovascular Problems:  No


Chemotherapy:  No


Chest Pain:  Yes


Congestive Heart Failure:  No


Cerebrovascular Accident:  No


Diabetes:  No


Diminished Hearing:  No


Gastrointestinal Disorders:  Yes


Genitourinary:  No


Hypertension:  No


Immune Disorder:  No


Implanted Vascular Access Dvce:  No


Musculoskeletal:  No


Neurologic:  No


Psychiatric:  Yes (BIPOLAR DISOREDER)


Respiratory:  No


Immunizations Current:  Yes


Thyroid Disease:  Yes


Ulcer:  No


Pregnant?:  Unknown


:  3


Para:  2


Miscarriage:  1





Past Surgical History


Appendectomy:  No


Body Medical Devices:  BORDERLINE PERSONALITY; POLYSUBSTANCE ABUSE; ALCOHOL 

ABUSE; DEFIANT DISORDE


 Section:  Yes (X2)


Cholecystectomy:  No


Gynecologic Surgery:  Yes (C SECTIONS x2)


Hysterectomy:  No


Other Surgery:  Yes (LEFT FOREARM SURGERY)





Social History


Alcohol Use:  No (UNKNOWN )


Tobacco Use:  No


Substance Use:  Yes (UNKNOWN )





Allergies-Medications


(Allergen,Severity, Reaction):  


Coded Allergies:  


     *MDRO Multi-Drug Resistant Organism (Verified  Adverse Reaction, Unknown, )


 MRSA (arm wound) - 10/2015


 MRSA PCR Screen NEGATIVE - 3/24/2016 & 16


 ** Cleared per Infection Control **


Reported Meds & Prescriptions





Reported Meds & Active Scripts


Active


Reported


Lithium Carbonate 300 Mg Cap 300 Mg PO BID


Risperidone 3 Mg Tab 3 Mg PO Q12HR


Mirtazapine 15 Mg Tab 15 Mg PO HS








Review of Systems


Except as stated in HPI:  all other systems reviewed are Neg





Physical Exam


Narrative


GENERAL: 28yo F not in distress.


SKIN: Focused skin assessment warm/dry.


HEAD: Atraumatic. Normocephalic. 


EYES: Pupils equal and round 4mm bilaterally.  EOMI.


ENT: No nasal bleeding or discharge.  Mucous membranes pink and moist.


NECK: Trachea midline. No JVD. 


CARDIOVASCULAR: Regular rate and rhythm.  No murmur appreciated.


RESPIRATORY: No accessory muscle use. Clear to auscultation. Breath sounds 

equal bilaterally. 


GASTROINTESTINAL: Abdomen soft, non-tender, nondistended.


MUSCULOSKELETAL: No obvious deformities. No clubbing.  No cyanosis.  No edema. 


NEUROLOGICAL: Awake and alert. No obvious cranial nerve deficits.  Motor 

grossly within normal limits. Normal speech.





Data


Data


Last Documented VS





Vital Signs








  Date Time  Temp Pulse Resp B/P (MAP) Pulse Ox O2 Delivery O2 Flow Rate FiO2


 


18 18:17 98.1       


 


18 17:36  106 16  98 Room Air  








Orders





 Orders


Complete Blood Count With Diff (18 17:51)


Comprehensive Metabolic Panel (18 17:51)


Beta Hcg (Quant/Titer) (18 17:51)


Drug Screen, Random Urine (18 17:51)


Alcohol (Ethanol) (18 17:51)


Salicylates (Aspirin) (18 17:51)


Tylenol (Acetaminophen) (18 17:51)


Electrocardiogram (18 )


Potassium Chloride (Kcl) (18 19:30)





Labs





Laboratory Tests








Test


  18


18:00 18


18:05


 


White Blood Count 12.8 TH/MM3  


 


Red Blood Count 4.72 MIL/MM3  


 


Hemoglobin 13.0 GM/DL  


 


Hematocrit 38.8 %  


 


Mean Corpuscular Volume 82.2 FL  


 


Mean Corpuscular Hemoglobin 27.6 PG  


 


Mean Corpuscular Hemoglobin


Concent 33.5 % 


  


 


 


Red Cell Distribution Width 15.0 %  


 


Platelet Count 316 TH/MM3  


 


Mean Platelet Volume 7.0 FL  


 


Neutrophils (%) (Auto) 80.2 %  


 


Lymphocytes (%) (Auto) 14.5 %  


 


Monocytes (%) (Auto) 4.7 %  


 


Eosinophils (%) (Auto) 0.2 %  


 


Basophils (%) (Auto) 0.4 %  


 


Neutrophils # (Auto) 10.3 TH/MM3  


 


Lymphocytes # (Auto) 1.9 TH/MM3  


 


Monocytes # (Auto) 0.6 TH/MM3  


 


Eosinophils # (Auto) 0.0 TH/MM3  


 


Basophils # (Auto) 0.0 TH/MM3  


 


CBC Comment DIFF FINAL  


 


Differential Comment   


 


Blood Urea Nitrogen 10 MG/DL  


 


Creatinine 0.64 MG/DL  


 


Random Glucose 111 MG/DL  


 


Total Protein 8.2 GM/DL  


 


Albumin 3.6 GM/DL  


 


Calcium Level 8.5 MG/DL  


 


Alkaline Phosphatase 116 U/L  


 


Aspartate Amino Transf


(AST/SGOT) 40 U/L 


  


 


 


Alanine Aminotransferase


(ALT/SGPT) 43 U/L 


  


 


 


Total Bilirubin 0.3 MG/DL  


 


Sodium Level 139 MEQ/L  


 


Potassium Level 3.1 MEQ/L  


 


Chloride Level 107 MEQ/L  


 


Carbon Dioxide Level 23.5 MEQ/L  


 


Anion Gap 9 MEQ/L  


 


Estimat Glomerular Filtration


Rate 110 ML/MIN 


  


 


 


Human Chorionic Gonadotropin,


Quant LESS THAN 1


MIU/ML 


 


 


Salicylates Level 2.2 MG/DL  


 


Acetaminophen Level


  LESS THAN 2.0


MCG/ML 


 


 


Ethyl Alcohol Level 66 MG/DL  


 


Urine Opiates Screen  NEG 


 


Urine Barbiturates Screen  NEG 


 


Urine Amphetamines Screen  NEG 


 


Urine Benzodiazepines Screen  NEG 


 


Urine Cocaine Screen  POS 


 


Urine Cannabinoids Screen  NEG 











MDM


Medical Decision Making


Medical Screen Exam Complete:  Yes


Emergency Medical Condition:  Yes


Interpretation(s)


EKG: NSR 89bpm.  Normal axis.  Narrow QRS.  QTc 434ms.


Differential Diagnosis


Polysubstance abuse vs. psychosis vs. depression vs. bipolar


Narrative Course


28yo with history of polysubstance abuse brought in as baker act because she is 

believe to be a threat to herself.  Labs reviewed, WBC 12.8.  Mild hypokalemia 

at 3.1, replaced orally.  Pregnancy negative.  Alcohol 66.  U tox positive for 

cocaine. Acetaminophen and salicylate negative.  Pt is medically clear for 

psych evaluation.





Diagnosis





 Primary Impression:  


 Polysubstance abuse











Yolette Lane DO 2018 18:15

## 2018-01-23 VITALS — RESPIRATION RATE: 22 BRPM | HEART RATE: 93 BPM | DIASTOLIC BLOOD PRESSURE: 78 MMHG | SYSTOLIC BLOOD PRESSURE: 117 MMHG

## 2018-01-23 NOTE — PD
Physical Exam


Time Seen by Provider:  10:39


Narrative


Dr. Kendrick has evaluated the patient, lifted the Baker act and cleared the 

patient for discharge.





Data


Data


Last Documented VS





Vital Signs








  Date Time  Temp Pulse Resp B/P (MAP) Pulse Ox O2 Delivery O2 Flow Rate FiO2


 


1/23/18 10:00  93 22 117/78 (91)  Room Air  


 


1/22/18 19:59 98.0    97   








Orders





 Orders


Complete Blood Count With Diff (1/22/18 17:51)


Comprehensive Metabolic Panel (1/22/18 17:51)


Beta Hcg (Quant/Titer) (1/22/18 17:51)


Drug Screen, Random Urine (1/22/18 17:51)


Alcohol (Ethanol) (1/22/18 17:51)


Salicylates (Aspirin) (1/22/18 17:51)


Tylenol (Acetaminophen) (1/22/18 17:51)


Electrocardiogram (1/22/18 )


Potassium Chloride (Kcl) (1/22/18 19:30)


Psych Screen (1/22/18 19:32)


Diet Regular Basic (1/23/18 Breakfast)


Diet Regular Basic (1/23/18 Lunch)





Labs





Laboratory Tests








Test


  1/22/18


18:00 1/22/18


18:05


 


White Blood Count 12.8 TH/MM3  


 


Red Blood Count 4.72 MIL/MM3  


 


Hemoglobin 13.0 GM/DL  


 


Hematocrit 38.8 %  


 


Mean Corpuscular Volume 82.2 FL  


 


Mean Corpuscular Hemoglobin 27.6 PG  


 


Mean Corpuscular Hemoglobin


Concent 33.5 % 


  


 


 


Red Cell Distribution Width 15.0 %  


 


Platelet Count 316 TH/MM3  


 


Mean Platelet Volume 7.0 FL  


 


Neutrophils (%) (Auto) 80.2 %  


 


Lymphocytes (%) (Auto) 14.5 %  


 


Monocytes (%) (Auto) 4.7 %  


 


Eosinophils (%) (Auto) 0.2 %  


 


Basophils (%) (Auto) 0.4 %  


 


Neutrophils # (Auto) 10.3 TH/MM3  


 


Lymphocytes # (Auto) 1.9 TH/MM3  


 


Monocytes # (Auto) 0.6 TH/MM3  


 


Eosinophils # (Auto) 0.0 TH/MM3  


 


Basophils # (Auto) 0.0 TH/MM3  


 


CBC Comment DIFF FINAL  


 


Differential Comment   


 


Blood Urea Nitrogen 10 MG/DL  


 


Creatinine 0.64 MG/DL  


 


Random Glucose 111 MG/DL  


 


Total Protein 8.2 GM/DL  


 


Albumin 3.6 GM/DL  


 


Calcium Level 8.5 MG/DL  


 


Alkaline Phosphatase 116 U/L  


 


Aspartate Amino Transf


(AST/SGOT) 40 U/L 


  


 


 


Alanine Aminotransferase


(ALT/SGPT) 43 U/L 


  


 


 


Total Bilirubin 0.3 MG/DL  


 


Sodium Level 139 MEQ/L  


 


Potassium Level 3.1 MEQ/L  


 


Chloride Level 107 MEQ/L  


 


Carbon Dioxide Level 23.5 MEQ/L  


 


Anion Gap 9 MEQ/L  


 


Estimat Glomerular Filtration


Rate 110 ML/MIN 


  


 


 


Human Chorionic Gonadotropin,


Quant LESS THAN 1


MIU/ML 


 


 


Salicylates Level 2.2 MG/DL  


 


Acetaminophen Level


  LESS THAN 2.0


MCG/ML 


 


 


Ethyl Alcohol Level 66 MG/DL  


 


Urine Opiates Screen  NEG 


 


Urine Barbiturates Screen  NEG 


 


Urine Amphetamines Screen  NEG 


 


Urine Benzodiazepines Screen  NEG 


 


Urine Cocaine Screen  POS 


 


Urine Cannabinoids Screen  NEG 











MDM


Supervised Visit with [facility]:  No


Narrative Course


Dr. Kendrick has evaluated the patient, lifted the Baker act and cleared the 

patient for discharge.   Patient contracts safety.  Denies suicidal or 

homicidal ideations.  Patient will be provided community resource packet to [facility]/

[facility] for follow-up.  Has friends and family for support.  Patient was medically 

cleared by alternate provider prior to psych screening.  Patient has been 

evaluated by psychiatry and and is now cleared for discharge.


Diagnosis





 Primary Impression:  


 Cocaine abuse


 Additional Impression:  


 Alcohol abuse


Referrals:  


[facility] (Out patient)





[facility]





Primary Care Physician





Psychiatrist





[facility]


Patient Instructions:  Abuse of Alcohol (ED), Alcohol Dependence (ED), Alcohol 

Intoxication (ED), General Instructions, Polysubstance Abuse (ED)





***Additional Instruction:  


Contract safety to your self and others


Stop using drugs


Stop drinking alcohol


Follow-up with psychiatry


Follow-up with primary care provider


Follow-up with Abelardo Gallo


Return to the emergency department immediately with worsening of symptoms


***Med/Other Pt SpecificInfo:  No Change to Meds, No Meds Exist/No RX given


Disposition:  01 DISCHARGE HOME


Condition:  Stable











Crys Bravo Jan 23, 2018 10:41

## 2018-01-23 NOTE — PD
__________________________________________________





History of Present Illness


Chief Complaint:  Alcohol/Drug Intoxication


Time Seen by Provider:  10:00


Travel History


International Travel<30 Days:  No


Contact w/Intl Traveler<30days:  No


Known affected area:  No





Legal Status


Legal Status:  Baker Act


Baker Act Signed By:  [place] P.D[facility]


History of Present Illness:


27-year-old female presents under a Phillips act for allegedly engaging in 

suicidal behavior.  Patient apparently was seen taking several pills along with 

alcohol.  She was found stumbling and her family believes she will die from 

overdose should she not change her lifestyle.  Law enforcement felt she was a 

danger to herself and had been cutting herself yesterday.





Upon interview, the patient is alert and oriented 3.  This physician reviewed 

her toxicology screen and noted it was positive for alcohol and cocaine.  

However, at this time the patient does not appear intoxicated.  She denies any 

suicidal or homicidal ideation, plan or intent.  She has no psychotic symptoms 

and her cognition is intact.  She is verbally gary for safety and she is 

competent to do so.  This physician is referring her to [facility] for 

treatment of alcohol and substance abuse.  Patient also has a history of 

methadone maintenance but has not been seen at that clinic for approximately 

one week





PFSH


Past Medical History


Hx Anticoagulant Therapy:  No


ADHD:  Yes


Asthma:  Yes


Autoimmune Disease:  No


Anxiety:  Yes


Depression:  No


Cancer:  No


Cardiovascular Problems:  No


Chemotherapy:  No


Chest Pain:  Yes


Congestive Heart Failure:  No


Cerebrovascular Accident:  No


Diabetes:  No


Diminished Hearing:  No


Gastrointestinal Disorders:  Yes


Genitourinary:  No


Hypertension:  No


Immune Disorder:  No


Implanted Vascular Access Dvce:  No


Musculoskeletal:  No


Neurologic:  No


Psychiatric:  Yes (BIPOLAR DISOREDER)


Respiratory:  No


Immunizations Current:  Yes


Thyroid Disease:  Yes


Ulcer:  No


Pregnant?:  Unknown


:  3


Para:  2


Miscarriage:  1





Past Surgical History


Appendectomy:  No


Body Medical Devices:  BORDERLINE PERSONALITY; POLYSUBSTANCE ABUSE; ALCOHOL 

ABUSE; DEFIANT DISORDE


 Section:  Yes (X2)


Cholecystectomy:  No


Gynecologic Surgery:  Yes (C SECTIONS x2)


Hysterectomy:  No


Other Surgery:  Yes (LEFT FOREARM SURGERY)





Psychiatric History


Psychiatric History


Hx Psychiatric Treatment:  


PATIENT DENIES BUT ACCORDING TO MED REC, SHE IS ON PSYCH MEDS.  Patient


reports taking someone else's lithium intermittently.  This physician


feels patient's primary problem at this time is drug abuse.


History of Inpatient Treatment:  Yes


Guns or firearms in home:  No





Social History


Hx Alcohol Use:  Yes


Hx Tobacco Use:  No


Hx Substance Use:  Yes (HX OF IV DRUG USE. POLYSUBSTANCE)


Substance Use Type:  Crack


Other Substances Used:  MANY PAST VISITS HERE FOR SUBTANCE ABUSE


Hx of Substance Use Treatment:  Yes





Allergies-Medications


(Allergen,Severity, Reaction):  


Coded Allergies:  


     *MDRO Multi-Drug Resistant Organism (Verified  Adverse Reaction, Unknown, )


 MRSA (arm wound) - 10/2015


 MRSA PCR Screen NEGATIVE - 3/24/2016 & 16


 ** Cleared per Infection Control **


Reported Meds & Prescriptions





Reported Meds & Active Scripts


Active


Reported


Methadone Intensol Liq (Methadone HCl) 10 Mg/Ml Conc 130 Mg PO DAILY


Lithium Carbonate 300 Mg Cap 300 Mg PO BID


Risperidone 3 Mg Tab 3 Mg PO Q12HR


Mirtazapine 15 Mg Tab 15 Mg PO HS





Review of Systems


Except as stated in HPI:  all other systems reviewed are Neg





Mental Status Examination


Appearance:  Appropriate


Consciousness:  Alert


Orientation:  x4


Motor Activity:  Normal gait


Speech:  Unremarkable


Language:  Adequate


Fund of Knowledge:  Adequate


Attention and Concentration:  Adequate


Memory:  Unremarkable


Mood:  Appropriate


Affect:  Appropriate


Thought Process & Associations:  Intact


Thought Content:  Appropriate


Hallucination Type:  None


Delusion Type:  None


Suicidal Ideation:  No


Suicidal Plan:  No


Suicidal Intention:  No


Homicidal Ideation:  No


Homicidal Plan:  No


Homicidal Intention:  No


Insight:  Adequate


Judgment:  Adequate





MDM


Medical Decision Making


Medical Record Reviewed:  Yes


Assessment/Plan


Patient interviewed at bedside.  Electronic medical record reviewed.  Case 

discussed with nurse Sidney.  Patient's family is obviously concerned about 

her well-being and what she might do to herself or others in the future.  This 

physician acknowledges patient's behavior may be dangerous to herself or others 

but this risk is unpredictable and unavoidable at this time.  This physician 

encouraged patient to go to [facility] for further treatment.  This 

facility is not license for drug detox/rehabilitation.  Patient is felt to be 

competent at this time and is able to make her own decisions regarding 

treatment.





Orders





 Orders


Complete Blood Count With Diff (18 17:51)


Comprehensive Metabolic Panel (18 17:51)


Beta Hcg (Quant/Titer) (18 17:51)


Drug Screen, Random Urine (18 17:51)


Alcohol (Ethanol) (18 17:51)


Salicylates (Aspirin) (18 17:51)


Tylenol (Acetaminophen) (18 17:51)


Electrocardiogram (18 )


Potassium Chloride (Kcl) (18 19:30)


Psych Screen (18 19:32)


Diet Regular Basic (18 Breakfast)





Results





Vital Signs








  Date Time  Temp Pulse Resp B/P (MAP) Pulse Ox O2 Delivery O2 Flow Rate FiO2


 


18 10:00  93 22 117/78 (91)  Room Air  


 


18 02:42        


 


18 19:59 98.0 98 18 110/67 (81) 97 Room Air  


 


18 18:17 98.1       


 


18 17:36  106 16 106/66 (79) 98 Room Air  











Laboratory Tests








Test


  18


18:00 18


18:05


 


White Blood Count 12.8  


 


Red Blood Count 4.72  


 


Hemoglobin 13.0  


 


Hematocrit 38.8  


 


Mean Corpuscular Volume 82.2  


 


Mean Corpuscular Hemoglobin 27.6  


 


Mean Corpuscular Hemoglobin


Concent 33.5 


  


 


 


Red Cell Distribution Width 15.0  


 


Platelet Count 316  


 


Mean Platelet Volume 7.0  


 


Neutrophils (%) (Auto) 80.2  


 


Lymphocytes (%) (Auto) 14.5  


 


Monocytes (%) (Auto) 4.7  


 


Eosinophils (%) (Auto) 0.2  


 


Basophils (%) (Auto) 0.4  


 


Neutrophils # (Auto) 10.3  


 


Lymphocytes # (Auto) 1.9  


 


Monocytes # (Auto) 0.6  


 


Eosinophils # (Auto) 0.0  


 


Basophils # (Auto) 0.0  


 


CBC Comment DIFF FINAL  


 


Differential Comment   


 


Blood Urea Nitrogen 10  


 


Creatinine 0.64  


 


Random Glucose 111  


 


Total Protein 8.2  


 


Albumin 3.6  


 


Calcium Level 8.5  


 


Alkaline Phosphatase 116  


 


Aspartate Amino Transf


(AST/SGOT) 40 


  


 


 


Alanine Aminotransferase


(ALT/SGPT) 43 


  


 


 


Total Bilirubin 0.3  


 


Sodium Level 139  


 


Potassium Level 3.1  


 


Chloride Level 107  


 


Carbon Dioxide Level 23.5  


 


Anion Gap 9  


 


Estimat Glomerular Filtration


Rate 110 


  


 


 


Human Chorionic Gonadotropin,


Quant LESS THAN 1 


  


 


 


Salicylates Level 2.2  


 


Acetaminophen Level LESS THAN 2.0  


 


Ethyl Alcohol Level 66  


 


Urine Opiates Screen  NEG 


 


Urine Barbiturates Screen  NEG 


 


Urine Amphetamines Screen  NEG 


 


Urine Benzodiazepines Screen  NEG 


 


Urine Cocaine Screen  POS 


 


Urine Cannabinoids Screen  NEG 











Diagnosis





 Primary Impression:  


 Cocaine abuse


 Additional Impression:  


 Alcohol abuse





Problem Qualifiers











Xavier Kendrick MD 2018 10:34

## 2018-01-23 NOTE — EKG
Date Performed: 01/22/2018       Time Performed: 18:06:42

 

PTAGE:      27 years

 

EKG:      Sinus rhythm 

 

 NORMAL ECG

 

PREVIOUS TRACING       : 08/15/2016 05.42

 

DOCTOR:   Jahaira Ruth  Interpretating Date/Time  01/23/2018 17:32:36

## 2018-03-13 ENCOUNTER — HOSPITAL ENCOUNTER (INPATIENT)
Dept: HOSPITAL 17 - NEPD | Age: 28
LOS: 2 days | Discharge: LEFT BEFORE DISCHARGE | DRG: 872 | End: 2018-03-15
Attending: HOSPITALIST | Admitting: HOSPITALIST
Payer: COMMERCIAL

## 2018-03-13 VITALS
HEART RATE: 118 BPM | OXYGEN SATURATION: 100 % | RESPIRATION RATE: 20 BRPM | SYSTOLIC BLOOD PRESSURE: 133 MMHG | DIASTOLIC BLOOD PRESSURE: 71 MMHG | TEMPERATURE: 98 F

## 2018-03-13 VITALS
DIASTOLIC BLOOD PRESSURE: 77 MMHG | HEART RATE: 85 BPM | TEMPERATURE: 97.7 F | SYSTOLIC BLOOD PRESSURE: 125 MMHG | RESPIRATION RATE: 20 BRPM

## 2018-03-13 VITALS
TEMPERATURE: 98.3 F | RESPIRATION RATE: 19 BRPM | SYSTOLIC BLOOD PRESSURE: 115 MMHG | HEART RATE: 102 BPM | DIASTOLIC BLOOD PRESSURE: 81 MMHG | OXYGEN SATURATION: 98 %

## 2018-03-13 VITALS
OXYGEN SATURATION: 97 % | DIASTOLIC BLOOD PRESSURE: 71 MMHG | SYSTOLIC BLOOD PRESSURE: 133 MMHG | RESPIRATION RATE: 16 BRPM | HEART RATE: 114 BPM

## 2018-03-13 VITALS
HEART RATE: 97 BPM | OXYGEN SATURATION: 98 % | SYSTOLIC BLOOD PRESSURE: 129 MMHG | DIASTOLIC BLOOD PRESSURE: 83 MMHG | RESPIRATION RATE: 18 BRPM | TEMPERATURE: 97.5 F

## 2018-03-13 VITALS — HEIGHT: 59 IN | BODY MASS INDEX: 21.33 KG/M2 | WEIGHT: 105.82 LBS

## 2018-03-13 DIAGNOSIS — F17.200: ICD-10-CM

## 2018-03-13 DIAGNOSIS — F31.9: ICD-10-CM

## 2018-03-13 DIAGNOSIS — M67.879: ICD-10-CM

## 2018-03-13 DIAGNOSIS — J45.909: ICD-10-CM

## 2018-03-13 DIAGNOSIS — L03.116: ICD-10-CM

## 2018-03-13 DIAGNOSIS — R00.0: ICD-10-CM

## 2018-03-13 DIAGNOSIS — E87.6: ICD-10-CM

## 2018-03-13 DIAGNOSIS — F41.9: ICD-10-CM

## 2018-03-13 DIAGNOSIS — F90.9: ICD-10-CM

## 2018-03-13 DIAGNOSIS — F14.120: ICD-10-CM

## 2018-03-13 DIAGNOSIS — A41.9: Primary | ICD-10-CM

## 2018-03-13 DIAGNOSIS — F19.120: ICD-10-CM

## 2018-03-13 LAB
ALBUMIN SERPL-MCNC: 3.2 GM/DL (ref 3.4–5)
ALP SERPL-CCNC: 159 U/L (ref 45–117)
ALT SERPL-CCNC: 30 U/L (ref 10–53)
AST SERPL-CCNC: 41 U/L (ref 15–37)
BASOPHILS # BLD AUTO: 0 TH/MM3 (ref 0–0.2)
BASOPHILS NFR BLD: 0.3 % (ref 0–2)
BILIRUB SERPL-MCNC: 0.3 MG/DL (ref 0.2–1)
BUN SERPL-MCNC: 15 MG/DL (ref 7–18)
CALCIUM SERPL-MCNC: 8.7 MG/DL (ref 8.5–10.1)
CHLORIDE SERPL-SCNC: 101 MEQ/L (ref 98–107)
CREAT SERPL-MCNC: 0.77 MG/DL (ref 0.5–1)
EOSINOPHIL # BLD: 0.1 TH/MM3 (ref 0–0.4)
EOSINOPHIL NFR BLD: 0.5 % (ref 0–4)
ERYTHROCYTE [DISTWIDTH] IN BLOOD BY AUTOMATED COUNT: 14.5 % (ref 11.6–17.2)
GFR SERPLBLD BASED ON 1.73 SQ M-ARVRAT: 89 ML/MIN (ref 89–?)
GLUCOSE SERPL-MCNC: 150 MG/DL (ref 74–106)
HCO3 BLD-SCNC: 20.9 MEQ/L (ref 21–32)
HCT VFR BLD CALC: 39.9 % (ref 35–46)
HGB BLD-MCNC: 14.1 GM/DL (ref 11.6–15.3)
LYMPHOCYTES # BLD AUTO: 4 TH/MM3 (ref 1–4.8)
LYMPHOCYTES NFR BLD AUTO: 27.5 % (ref 9–44)
MCH RBC QN AUTO: 30 PG (ref 27–34)
MCHC RBC AUTO-ENTMCNC: 35.3 % (ref 32–36)
MCV RBC AUTO: 84.9 FL (ref 80–100)
MONOCYTE #: 1.1 TH/MM3 (ref 0–0.9)
MONOCYTES NFR BLD: 8 % (ref 0–8)
NEUTROPHILS # BLD AUTO: 9.2 TH/MM3 (ref 1.8–7.7)
NEUTROPHILS NFR BLD AUTO: 63.7 % (ref 16–70)
PLATELET # BLD: 494 TH/MM3 (ref 150–450)
PMV BLD AUTO: 7.1 FL (ref 7–11)
PROT SERPL-MCNC: 8.3 GM/DL (ref 6.4–8.2)
RBC # BLD AUTO: 4.7 MIL/MM3 (ref 4–5.3)
SODIUM SERPL-SCNC: 137 MEQ/L (ref 136–145)
WBC # BLD AUTO: 14.4 TH/MM3 (ref 4–11)

## 2018-03-13 PROCEDURE — A9579 GAD-BASE MR CONTRAST NOS,1ML: HCPCS

## 2018-03-13 PROCEDURE — 82550 ASSAY OF CK (CPK): CPT

## 2018-03-13 PROCEDURE — 99285 EMERGENCY DEPT VISIT HI MDM: CPT

## 2018-03-13 PROCEDURE — 80178 ASSAY OF LITHIUM: CPT

## 2018-03-13 PROCEDURE — 80053 COMPREHEN METABOLIC PANEL: CPT

## 2018-03-13 PROCEDURE — 84702 CHORIONIC GONADOTROPIN TEST: CPT

## 2018-03-13 PROCEDURE — 84703 CHORIONIC GONADOTROPIN ASSAY: CPT

## 2018-03-13 PROCEDURE — 87116 MYCOBACTERIA CULTURE: CPT

## 2018-03-13 PROCEDURE — 80307 DRUG TEST PRSMV CHEM ANLYZR: CPT

## 2018-03-13 PROCEDURE — 87040 BLOOD CULTURE FOR BACTERIA: CPT

## 2018-03-13 PROCEDURE — 73723 MRI JOINT LWR EXTR W/O&W/DYE: CPT

## 2018-03-13 PROCEDURE — 85025 COMPLETE CBC W/AUTO DIFF WBC: CPT

## 2018-03-13 PROCEDURE — 76937 US GUIDE VASCULAR ACCESS: CPT

## 2018-03-13 PROCEDURE — 80202 ASSAY OF VANCOMYCIN: CPT

## 2018-03-13 PROCEDURE — 87015 SPECIMEN INFECT AGNT CONCNTJ: CPT

## 2018-03-13 PROCEDURE — 85652 RBC SED RATE AUTOMATED: CPT

## 2018-03-13 PROCEDURE — 83605 ASSAY OF LACTIC ACID: CPT

## 2018-03-13 PROCEDURE — 87206 SMEAR FLUORESCENT/ACID STAI: CPT

## 2018-03-13 PROCEDURE — 86140 C-REACTIVE PROTEIN: CPT

## 2018-03-13 RX ADMIN — SODIUM CHLORIDE SCH MLS/HR: 900 INJECTION INTRAVENOUS at 16:34

## 2018-03-13 RX ADMIN — LEVOFLOXACIN SCH MLS/HR: 5 INJECTION, SOLUTION INTRAVENOUS at 16:33

## 2018-03-13 RX ADMIN — PHENYTOIN SODIUM SCH MLS/HR: 50 INJECTION INTRAMUSCULAR; INTRAVENOUS at 16:34

## 2018-03-13 RX ADMIN — CLONIDINE HYDROCHLORIDE PRN MG: 0.1 INJECTION, SOLUTION EPIDURAL at 21:26

## 2018-03-13 NOTE — PD
HPI


Chief Complaint:  Alcohol/Drug Intoxication


Time Seen by Provider:  07:58


Travel History


International Travel<30 days:  No


Contact w/Intl Traveler<30days:  No


Traveled to known affect area:  No





History of Present Illness


HPI


27-year-old female with a history of drug abuse presents to the emergency room 

intoxicated on alcohol and cocaine.  She is brought in by police under a Baker 

act.  Patient is initially combative and had to be restrained with four-point 

soft limb restraints and 1 mg IM Ativan.  After she calmed down, she admits to 

using drugs but denies homicidal or suicidal ideation.  Patient reports 

significant left ankle pain from a skin infection.  She denies any fevers, 

chills, nausea, or vomiting.  She reports mild headache.  She has history of 

endocarditis but denies chest pain or shortness of breath at this time.  

Unknown last menstrual cycle.





PFSH


Past Medical History


Hx Anticoagulant Therapy:  No


ADHD:  Yes


Asthma:  Yes


Autoimmune Disease:  No


Anxiety:  Yes


Depression:  No


Cancer:  No


Cardiovascular Problems:  No


Chemotherapy:  No


Chest Pain:  Yes


Congestive Heart Failure:  No


Cerebrovascular Accident:  No


Diabetes:  No


Diminished Hearing:  No


Gastrointestinal Disorders:  Yes


Genitourinary:  No


Hypertension:  No


Immune Disorder:  No


Implanted Vascular Access Dvce:  No


Musculoskeletal:  No


Neurologic:  No


Psychiatric:  Yes


Respiratory:  No


Immunizations Current:  Yes


Seizures:  Yes (PATIENT DENIES)


Thyroid Disease:  Yes


Ulcer:  No


Pregnant?:  Unknown


:  3


Para:  2


Miscarriage:  1





Past Surgical History


Appendectomy:  No


Body Medical Devices:  BORDERLINE PERSONALITY; POLYSUBSTANCE ABUSE; ALCOHOL 

ABUSE; DEFIANT DISORDE


 Section:  Yes (X2)


Cholecystectomy:  No


Gynecologic Surgery:  Yes (C SECTIONS x2)


Hysterectomy:  No


Other Surgery:  Yes (LEFT FOREARM SURGERY)





Social History


Alcohol Use:  Yes


Tobacco Use:  Yes


Substance Use:  Yes (HX OF IV DRUG USE. POLYSUBSTANCE)





Allergies-Medications


(Allergen,Severity, Reaction):  


Coded Allergies:  


     *MDRO Multi-Drug Resistant Organism (Verified  Adverse Reaction, Unknown, 3

/13/18)


 MRSA (arm wound) - 10/2015


 MRSA PCR Screen NEGATIVE - 3/24/2016 & 16


 ** Cleared per Infection Control **


Reported Meds & Prescriptions





Reported Meds & Active Scripts


Active


Reported


Methadone Intensol Liq (Methadone HCl) 10 Mg/Ml Conc 130 Mg PO DAILY


Lithium Carbonate 300 Mg Cap 300 Mg PO BID


Risperidone 3 Mg Tab 3 Mg PO Q12HR


Mirtazapine 15 Mg Tab 15 Mg PO HS








Review of Systems


Except as stated in HPI:  all other systems reviewed are Neg





Physical Exam


Narrative


GENERAL: Well-nourished, well-developed female no acute distress.  Afebrile.  

Ambulatory.


SKIN: Focused skin assessment warm/dry.  Multiple superficial wounds to face, 

chest, bilateral upper extremities.  There is a 3 cm in diameter using 

ulceration to the left lateral ankle with slight surrounding erythema.  No 

lymphangitis.  Extremely tender to palpation.


HEAD: Normocephalic.


EYES: No scleral icterus. No injection or drainage. 


NECK: Supple, trachea midline. No JVD or lymphadenopathy.


CARDIOVASCULAR: Regular rate and rhythm without murmurs, gallops, or rubs. 


RESPIRATORY: Breath sounds equal bilaterally. No accessory muscle use.


MUSCULOSKELETAL: No cyanosis.  Mild edema to the left lateral ankle.  Full 

range of motion of the ankle.  2+ dorsalis pedis pulse.





Data


Data


Last Documented VS





Vital Signs








  Date Time  Temp Pulse Resp B/P (MAP) Pulse Ox O2 Delivery O2 Flow Rate FiO2


 


3/13/18 07:47  114 16 133/71 (91) 97 Room Air  


 


3/13/18 07:34 98.0       








Orders





 Orders


Complete Blood Count With Diff (3/13/18 07:52)


Comprehensive Metabolic Panel (3/13/18 07:52)


Ed Urine Pregnancytest Poc (3/13/18 07:52)


Psych Screen (3/13/18 07:52)


Drug Screen, Random Urine (3/13/18 07:52)


Alcohol (Ethanol) (3/13/18 07:52)


Restraints Non-Violent MARV.Q3H (3/13/18 08:02)


Lorazepam Inj (Ativan Inj) (3/13/18 08:15)


Lorazepam Inj (Ativan Inj) (3/13/18 08:30)


Beta Hcg (Quant/Titer) (3/13/18 10:01)


Sulfamet-Trimeth Ds 800-160 Mg (Bactrim (3/13/18 10:15)


Lactic Acid Sepsis Protocol (3/13/18 10:24)


Blood Culture (3/13/18 10:24)


Vascular Access Team Consult/P PRN (3/13/18 10:54)


Vascular Poc Ultrasound (3/13/18 )


Sodium Chlor 0.9% 1000 Ml Inj (Ns 1000 M (3/13/18 11:00)


Sodium Chlor 0.9% 1000 Ml Inj (Ns 1000 M (3/13/18 11:00)


Complete Blood Count With Diff (3/14/18 06:00)


Comprehensive Metabolic Panel (3/14/18 06:00)


Admit Order (Ed Use Only) (3/13/18 11:10)





Labs





Laboratory Tests








Test


  3/13/18


07:40 3/13/18


09:45


 


White Blood Count 14.4 TH/MM3  


 


Red Blood Count 4.70 MIL/MM3  


 


Hemoglobin 14.1 GM/DL  


 


Hematocrit 39.9 %  


 


Mean Corpuscular Volume 84.9 FL  


 


Mean Corpuscular Hemoglobin 30.0 PG  


 


Mean Corpuscular Hemoglobin


Concent 35.3 % 


  


 


 


Red Cell Distribution Width 14.5 %  


 


Platelet Count 494 TH/MM3  


 


Mean Platelet Volume 7.1 FL  


 


Neutrophils (%) (Auto) 63.7 %  


 


Lymphocytes (%) (Auto) 27.5 %  


 


Monocytes (%) (Auto) 8.0 %  


 


Eosinophils (%) (Auto) 0.5 %  


 


Basophils (%) (Auto) 0.3 %  


 


Neutrophils # (Auto) 9.2 TH/MM3  


 


Lymphocytes # (Auto) 4.0 TH/MM3  


 


Monocytes # (Auto) 1.1 TH/MM3  


 


Eosinophils # (Auto) 0.1 TH/MM3  


 


Basophils # (Auto) 0.0 TH/MM3  


 


CBC Comment DIFF FINAL  


 


Differential Comment   


 


Blood Urea Nitrogen 15 MG/DL  


 


Creatinine 0.77 MG/DL  


 


Random Glucose 150 MG/DL  


 


Total Protein 8.3 GM/DL  


 


Albumin 3.2 GM/DL  


 


Calcium Level 8.7 MG/DL  


 


Alkaline Phosphatase 159 U/L  


 


Aspartate Amino Transf


(AST/SGOT) 41 U/L 


  


 


 


Alanine Aminotransferase


(ALT/SGPT) 30 U/L 


  


 


 


Total Bilirubin 0.3 MG/DL  


 


Sodium Level 137 MEQ/L  


 


Potassium Level 3.2 MEQ/L  


 


Chloride Level 101 MEQ/L  


 


Carbon Dioxide Level 20.9 MEQ/L  


 


Anion Gap 15 MEQ/L  


 


Estimat Glomerular Filtration


Rate 89 ML/MIN 


  


 


 


Human Chorionic Gonadotropin,


Quant LESS THAN 1


MIU/ML 


 


 


Ethyl Alcohol Level 268 MG/DL  


 


Urine Opiates Screen  NEG 


 


Urine Barbiturates Screen  NEG 


 


Urine Amphetamines Screen  NEG 


 


Urine Benzodiazepines Screen  NEG 


 


Urine Cocaine Screen  POS 


 


Urine Cannabinoids Screen  NEG 











MDM


Medical Decision Making


Medical Screen Exam Complete:  Yes


Emergency Medical Condition:  Yes


Medical Record Reviewed:  Yes


Differential Diagnosis


IV drug abuse, suicidal ideation, polysubstance abuse, skin lesion, MRSA


Narrative Course


27-year-old female with history of IV drug abuse, endocarditis, wound 

infections presents to the emergency room under Baker act initiated by Police 

Department because patient is intoxicated.  She denies suicidal or homicidal 

ideation at this time.  Patient admits to using cocaine and alcohol.  Denies IV 

drug use for 2 weeks.  She has significant history of IV drug use with sepsis, 

endocarditis, and septic arthritis.  She is tachycardic in the emergency room.  

She was given Ativan for erratic behavior and calmed down but remains 

tachycardic.  CBC shows leukocytosis of 14.4.  CMP is only remarkable for 

mildly elevated alkaline phosphatase and mild hypokalemia.  HCG is less than 1.

  Toxicology is only positive for cocaine and alcohol.  Blood cultures and 

lactic acid ordered and pending.  Patient has a large, infected appearing wound 

to the left lateral ankle with surrounding cellulitis but no lymphangitis.  

Patient was initially given Bactrim however she meets inpatient criteria for 

sepsis.  I spoke to Dr. Lott who will admit this patient to his service.





Sepsis Criteria


SIRS Criteria (2 or more):  Heart rate over 90, WBC > 84313, < 4000 or > 10% 

bands


Sepsis Criteria (SIRS+source):  Infect source susp/known


Condition:  Stable











Marquita Valentin Mar 13, 2018 10:16

## 2018-03-13 NOTE — MB
cc:

Eric Arciniega DPM

****

 

 

DATE OF CONSULT:

03/13/2018

 

REASON FOR CONSULTATION:

Left ankle possible abscess.

 

HISTORY OF PRESENT ILLNESS:

This is a 27-year-old female who is apparently Phillips Acted.  She had a

worsening ankle condition.  The police were called.  She was found to 

be intoxicated.  She evaluated in the emergency room.  She has a 

history of IV drug abuse.  Per the patient, she injected her ankle 

within the last 2 weeks with IV Dilaudid.  She admits she has history 

of endocarditis, possible hepatitis C.  She admits to drinking a 

gallon of alcohol per day, unsure if it is beer or liquor.  History of

multidrug resistent organism, arm wound.  The patient apparently is on

psych medication.  Inpatient medications, she is on vancomycin and 

Levaquin.  She received Bactrim p.o. in the ER.  The patient is alert 

and cooperative; however, a limited historian.

 

PHYSICAL EXAMINATION:

VITAL SIGNS:  Temperature is 98.3, pulse rate 102.  She is breathing 

at 19 breaths per minute.  Blood pressure 118/81.

GENERAL:  This is an alert and oriented female.  She is somewhat 

sluggish and slurred in her speech.  She is sleepy.  She is in 

restraints.

EXTREMITIES:  The bilateral lower extremities are examined.  There is 

noted to be superficial abrasions diffusely; however, the focus of the

exam is on the lateral ankle, there is a mixture of purulent necrotic 

fibrous tissue at the level of the peroneal tendons just proximal to 

the ankle joint.  There is periwound erythema.  It is very tender.  

Upon pressure to the area, there is mixture of seropurulent drainage. 

Focal erythema and edema is localized to the ankle.  There are no 

signs of tracking up the soft tissue compartments.  Pulses are 

palpable bilateral.  The patient has good range of motion of the 

extremities.  Sensation is intact.

 

LABORATORY FINDINGS:

Sodium 137, potassium 3.2, chloride 101, CO2 of 20.9, BUN is 15, 

creatinine is 0.77 random glucose 150, AST 41, ALT Is 30.  White blood

cell 14.4, hemoglobin and hematocrit 14 and 39, platelet count is 494.

 Toxicology is positive for cocaine in the urine.  Ethanol alcohol is 

268.

 

MICROBIAL FINDINGS:

Blood culture ordered and pending.  Wound culture ordered bedside at 

the time of the visit.

 

IMAGING STUDIES:

MRI also ordered at the time of the visit.

 

ASSESSMENT AND PLAN:

Left ankle ulcer, abscess, intravenous drug abuse.  Plan is for MRI, 

wound culture.  Patient may need operative debridement.  Reviewed case

with Medicine.  Will follow appropriately and advise.  Continue IV 

antibiotics at this time.  Wound care educated to the nurse, a 

nonadherent dry sterile bandage to absorb any drainage.  Will follow 

up within 1-2 days.

 

 

 

__________________________________

YANDY Castrejon/HUY

D: 03/13/2018, 06:48 PM

T: 03/13/2018, 07:25 PM

Visit #: [id number]

Job #: [id number]

## 2018-03-13 NOTE — HHI.HP
__________________________________________________





HPI


Service


[facility]


Primary Care Physician


No Primary Care Physician


Admission Diagnosis





Sepsis, wound infection, IV drug abuse


Diagnoses:  


Chief Complaint:  


Screaming, drinking


Travel History


International Travel<30 Days:  No


Contact w/Intl Traveler <30 Da:  No


Traveled to Known Affected Are:  No


History of Present Illness


27-year-old white female being admitted for sepsis as well as being Baker acted 

secondary to intoxicated behavior.





Patient was in her usual state of health up until at some point when she was 

found by police walking around publicly screaming, appeared to be intoxicated.  

She was Phillips acted by the police and brought to the emergency department.  

Patient herself is a very limited historian, on multiple occasions she has told 

me to "get out of my face" with a very flat affect when being asked questions 

about her transpiring events.  Patient denies having any pain.  But we palpate 

her left foot which is clearly edematous and infected, she flinches and says it 

hurts.  She herself denies any suicidal ideation but according to the police 

report she did endorse it with them.  Patient does admit to injecting herself 

with IV drugs into her leg but she does not specify if this is the same leg as 

to where she has her ulcerated lesion.  When asked if she has any nausea 

vomiting fevers or chills she does not answer those questions.  She had one-

point does endorse that she has a prior history of endocarditis.





Review of Systems


Except as stated in HPI:  all other systems reviewed are Neg





Past Family Social History


Past Medical History


endocarditis


Allergies:  


Coded Allergies:  


     *MDRO Multi-Drug Resistant Organism (Verified  Adverse Reaction, Unknown, 3

/13/18)


 MRSA (arm wound) - 10/2015


 MRSA PCR Screen NEGATIVE - 3/24/2016 & 16


 ** Cleared per Infection Control **


Social History


drug use, heavy ETOH drinker





Physical Exam


Vital Signs





Vital Signs








  Date Time  Temp Pulse Resp B/P (MAP) Pulse Ox O2 Delivery O2 Flow Rate FiO2


 


3/13/18 07:47  114 16 133/71 (91) 97 Room Air  


 


3/13/18 07:34 98.0 118 20 133/71 (91) 100   








Physical Exam


VS: afebrile


GENERAL: Lying in bed, awake, alert, no acute distress, very disengaged from my 

conversation.  Appears disheveled


SKIN: Warm and dry.


EYES: Pupils equal and round and appear quite dilated and very minimally 

reactive


ENT: No nasal bleeding or discharge.  Mucous membranes pink and moist.


CARDIOVASCULAR: Regular rate and rhythm.  no murmurs


RESPIRATORY: No accessory muscle use. Clear to auscultation. Breath sounds 

equal bilaterally. 


GASTROINTESTINAL: Abdomen soft, non-tender, nondistended. 


Extremities: No clubbing, cyanosis, or edema. No obvious deformities. 


MUSCULOSKELETAL: Has a obvious at least quarter size deep ulcerated lesion just 

proximal to the left lateral malleolus with dried blood this lesion is quite 

tender to touch as well as her lateral malleolus.  But she does have intact 

dorsiflexion and plantar flexion of the foot with no issue.  Patient appears to 

have multiple dried scabs all throughout her lower extremities


NEUROLOGICAL: Awake and alert. No obvious cranial nerve deficits.  No facial 

droop nor slurred speech noted.


PSYCHIATRIC: Appropriate mood and affect; insight and judgment normal.


Laboratory





Laboratory Tests








Test


  3/13/18


07:40 3/13/18


09:45 3/13/18


11:45


 


White Blood Count 14.4   


 


Red Blood Count 4.70   


 


Hemoglobin 14.1   


 


Hematocrit 39.9   


 


Mean Corpuscular Volume 84.9   


 


Mean Corpuscular Hemoglobin 30.0   


 


Mean Corpuscular Hemoglobin


Concent 35.3 


  


  


 


 


Red Cell Distribution Width 14.5   


 


Platelet Count 494   


 


Mean Platelet Volume 7.1   


 


Neutrophils (%) (Auto) 63.7   


 


Lymphocytes (%) (Auto) 27.5   


 


Monocytes (%) (Auto) 8.0   


 


Eosinophils (%) (Auto) 0.5   


 


Basophils (%) (Auto) 0.3   


 


Neutrophils # (Auto) 9.2   


 


Lymphocytes # (Auto) 4.0   


 


Monocytes # (Auto) 1.1   


 


Eosinophils # (Auto) 0.1   


 


Basophils # (Auto) 0.0   


 


CBC Comment DIFF FINAL   


 


Differential Comment    


 


Blood Urea Nitrogen 15   


 


Creatinine 0.77   


 


Random Glucose 150   


 


Total Protein 8.3   


 


Albumin 3.2   


 


Calcium Level 8.7   


 


Alkaline Phosphatase 159   


 


Aspartate Amino Transf


(AST/SGOT) 41 


  


  


 


 


Alanine Aminotransferase


(ALT/SGPT) 30 


  


  


 


 


Total Bilirubin 0.3   


 


Sodium Level 137   


 


Potassium Level 3.2   


 


Chloride Level 101   


 


Carbon Dioxide Level 20.9   


 


Anion Gap 15   


 


Estimat Glomerular Filtration


Rate 89 


  


  


 


 


Human Chorionic Gonadotropin,


Quant LESS THAN 1 


  


  


 


 


Ethyl Alcohol Level 268   


 


Urine Opiates Screen  NEG  


 


Urine Barbiturates Screen  NEG  


 


Urine Amphetamines Screen  NEG  


 


Urine Benzodiazepines Screen  NEG  


 


Urine Cocaine Screen  POS  


 


Urine Cannabinoids Screen  NEG  


 


Lactic Acid Level   2.0 














 Date/Time


Source Procedure


Growth Status


 


 


 3/13/18 11:45


Blood Peripheral Aerobic Blood Culture


Pending Received


 


 3/13/18 11:45


Blood Peripheral Anaerobic Blood Culture


Pending Received








Result Diagram:  


3/13/18 0740                                                                   

             3/13/18 0740








Caprini VTE Risk Assessment


Caprini VTE Risk Assessment:  No/Low Risk (score <= 1)


Caprini Risk Assessment Model











 Point Value = 1          Point Value = 2  Point Value = 3  Point Value = 5


 


Age 41-60


Minor surgery


BMI > 25 kg/m2


Swollen legs


Varicose veins


Pregnancy or postpartum


History of unexplained or recurrent


   spontaneous 


Oral contraceptives or hormone


   replacement


Sepsis (< 1 month)


Serious lung disease, including


   pneumonia (< 1 month)


Abnormal pulmonary function


Acute myocardial infarction


Congestive heart failure (< 1 month)


History of inflammatory bowel disease


Medical patient at bed rest Age 61-74


Arthroscopic surgery


Major open surgery (> 45 min)


Laparoscopic surgery (> 45 min)


Malignancy


Confined to bed (> 72 hours)


Immobilizing plaster cast


Central venous access Age >= 75


History of VTE


Family history of VTE


Factor V Leiden


Prothrombin 81286M


Lupus anticoagulant


Anticardiolipin antibodies


Elevated serum homocysteine


Heparin-induced thrombocytopenia


Other congenital or acquired


   thrombophilia Stroke (< 1 month)


Elective arthroplasty


Hip, pelvis, or leg fracture


Acute spinal cord injury (< 1 month)








Prophylaxis Regimen











   Total Risk


Factor Score Risk Level Prophylaxis Regimen


 


0-1      Low Early ambulation


 


2 Moderate Order ONE of the following:


*Sequential Compression Device (SCD)


*Heparin 5000 units SQ BID


 


3-4 Higher Order ONE of the following medications:


*Heparin 5000 units SQ TID


*Enoxaparin/Lovenox 40 mg SQ daily (WT < 150 kg, CrCl > 30 mL/min)


*Enoxaparin/Lovenox 30 mg SQ daily (WT < 150 kg, CrCl > 10-29 mL/min)


*Enoxaparin/Lovenox 30 mg SQ BID (WT < 150 kg, CrCl > 30 mL/min)


AND/OR


*Sequential Compression Device (SCD)


 


5 or more Highest Order ONE of the following medications:


*Heparin 5000 units SQ TID (Preferred with Epidurals)


*Enoxaparin/Lovenox 40 mg SQ daily (WT < 150 kg, CrCl > 30 mL/min)


*Enoxaparin/Lovenox 30 mg SQ daily (WT < 150 kg, CrCl > 10-29 mL/min)


*Enoxaparin/Lovenox 30 mg SQ BID (WT < 150 kg, CrCl > 30 mL/min)


AND


*Sequential Compression Device (SCD)











Assessment and Plan


Assessment and Plan


27-year-old white female being admitted for sepsis secondary to cellulitis as 

well as behavioral disturbance





Sepsis


-Cultures drawn, Bactrim administered in the ER.  Ordered normal saline boluses 

followed by IV infusion, lactate is within normal limits


-Obtaining sed rate and CRP to see if this could be osteomyelitis, if elevated 

will consider MRI 





Infected ulcer of left ankle


-Continue Levaquin and vancomycin, consulting podiatry to see if debridement is 

an option and if osteomyelitis is suspected





Behavioral disturbance


-Given positive tox screen for cocaine as well as elevated blood alcohol level 

yet with history of bipolar per the medical records, I will consult psychiatry 

since the pt was Jacqueline Alberto and verbalized suicidality to police. Sitter 

placed. I will let psych decide on restarting her home psych meds. will 

consider haldol as needed for severe agitation 





Cocaine


-Monitor on telemetry, IV fluids, monitor for rhabdo, obtaining CK





Elevated alcohol levels


-[facility] protocol





Addendum: D/w podiatry, proceeding w/ MRI directly





Physician Certification


2 Midnight Certification Type:  Admission for Inpatient Services


Order for Inpatient Services


The services are ordered in accordance with Medicare regulations or non-

Medicare payer requirements, as applicable.  In the case of services not 

specified as inpatient-only, they are appropriately provided as inpatient 

services in accordance with the 2-midnight benchmark.


Estimated LOS (days):  3


3 days is the estimated time the patient will need to remain in the hospital, 

assuming treatment plan goals are met and no additional complications.


Post-Hospital Plan:  Not yet determined











Chace Lott MD Mar 13, 2018 13:24

## 2018-03-13 NOTE — RADRPT
EXAM DATE/TIME:  2018 20:45 

 

[facility] COMPARISON:     

No previous studies available for comparison.

       

 

 

INDICATIONS :     

Abscess. Wound on lateral aspect of left ankle.

                     

 

CONTRAST:     

9 cc Omniscan (gadodiamide) IV

                     

 

MEDICAL HISTORY :           

Asthma and IVDU.

 

SURGICAL HISTORY :     

 section.     Left forearm.

 

ENCOUNTER:     

Initial

 

ACUITY:     

3 day

 

PAIN SCORE:     

0/10

 

LOCATION:     

Left   Ankle.

 

TECHNIQUE:     

Multiplanar, multisequence MRI examination was performed without contrast and after the intravenous a
dministration of gadolinium.

 

FINDINGS:     

There is lateral predominant subcutaneous edema. A deep soft tissue ulcer is seen posterolaterally an
d with exposure of the distal peroneus brevis and longus muscle bellies. The epicenter of ulceration 
is approximately 3.8 cm proximal to the tip of the lateral malleolus. No muscle or tendon tear. Edema
tous tissue is seen around the distal fibular shaft and lateral malleolus and the bone has mild marro
w edema within it. However, no corresponding T1 signal abnormality or definite bone destruction. Ther
e is no drainable abscess.

 

The left tibia and the bones of the hindfoot are within normal limits.

 

There is moderate distal and insertional tendinosis of Achilles. There some low-grade longitudinal sp
litting along the deep margin. Focal reactive appearing marrow edema seen in the posterior calcaneus.
 There is a moderate amount of mildly indurated fluid in the retrocalcaneal bursa. No fracture.     

 

CONCLUSION:     

1. Focal deep soft tissue ulceration within a broad area of cellulitis posterolaterally of the distal
 leg. Mild, reactive appearing marrow edema in the distal shaft of the fibula without convincing evid
ence of osteomyelitis at this time. No abscess.

2. Moderate distal and insertional tendinosis of Achilles with low-grade partial tearing along the de
ep margin and retrocalcaneal bursitis.

 

 

 

 Martín Palm MD on 2018 at 23:11           

Board Certified Radiologist.

 This report was verified electronically.

## 2018-03-14 VITALS
SYSTOLIC BLOOD PRESSURE: 124 MMHG | DIASTOLIC BLOOD PRESSURE: 79 MMHG | RESPIRATION RATE: 18 BRPM | HEART RATE: 60 BPM | TEMPERATURE: 98.3 F | OXYGEN SATURATION: 100 %

## 2018-03-14 VITALS
DIASTOLIC BLOOD PRESSURE: 71 MMHG | TEMPERATURE: 98 F | RESPIRATION RATE: 18 BRPM | OXYGEN SATURATION: 99 % | SYSTOLIC BLOOD PRESSURE: 112 MMHG | HEART RATE: 86 BPM

## 2018-03-14 VITALS
SYSTOLIC BLOOD PRESSURE: 142 MMHG | DIASTOLIC BLOOD PRESSURE: 102 MMHG | OXYGEN SATURATION: 95 % | HEART RATE: 74 BPM | TEMPERATURE: 97.9 F | RESPIRATION RATE: 18 BRPM

## 2018-03-14 VITALS
TEMPERATURE: 98.1 F | RESPIRATION RATE: 18 BRPM | HEART RATE: 75 BPM | SYSTOLIC BLOOD PRESSURE: 138 MMHG | DIASTOLIC BLOOD PRESSURE: 83 MMHG | OXYGEN SATURATION: 100 %

## 2018-03-14 VITALS
TEMPERATURE: 98.2 F | OXYGEN SATURATION: 99 % | HEART RATE: 82 BPM | SYSTOLIC BLOOD PRESSURE: 140 MMHG | RESPIRATION RATE: 18 BRPM

## 2018-03-14 VITALS
DIASTOLIC BLOOD PRESSURE: 69 MMHG | RESPIRATION RATE: 17 BRPM | HEART RATE: 75 BPM | SYSTOLIC BLOOD PRESSURE: 134 MMHG | TEMPERATURE: 99 F | OXYGEN SATURATION: 100 %

## 2018-03-14 VITALS — HEART RATE: 80 BPM

## 2018-03-14 LAB
ALBUMIN SERPL-MCNC: 2.2 GM/DL (ref 3.4–5)
ALP SERPL-CCNC: 96 U/L (ref 45–117)
ALT SERPL-CCNC: 19 U/L (ref 10–53)
AST SERPL-CCNC: 27 U/L (ref 15–37)
BASOPHILS # BLD AUTO: 0 TH/MM3 (ref 0–0.2)
BASOPHILS NFR BLD: 0.3 % (ref 0–2)
BILIRUB SERPL-MCNC: 0.3 MG/DL (ref 0.2–1)
BUN SERPL-MCNC: 12 MG/DL (ref 7–18)
CALCIUM SERPL-MCNC: 8.2 MG/DL (ref 8.5–10.1)
CHLORIDE SERPL-SCNC: 107 MEQ/L (ref 98–107)
CREAT SERPL-MCNC: 0.45 MG/DL (ref 0.5–1)
CRP SERPL-MCNC: 1.5 MG/DL (ref 0–0.3)
EOSINOPHIL # BLD: 0.1 TH/MM3 (ref 0–0.4)
EOSINOPHIL NFR BLD: 1.9 % (ref 0–4)
ERYTHROCYTE [DISTWIDTH] IN BLOOD BY AUTOMATED COUNT: 15 % (ref 11.6–17.2)
GFR SERPLBLD BASED ON 1.73 SQ M-ARVRAT: 166 ML/MIN (ref 89–?)
GLUCOSE SERPL-MCNC: 85 MG/DL (ref 74–106)
HCO3 BLD-SCNC: 19.8 MEQ/L (ref 21–32)
HCT VFR BLD CALC: 33.5 % (ref 35–46)
HGB BLD-MCNC: 11.4 GM/DL (ref 11.6–15.3)
LYMPHOCYTES # BLD AUTO: 2.4 TH/MM3 (ref 1–4.8)
LYMPHOCYTES NFR BLD AUTO: 36.1 % (ref 9–44)
MCH RBC QN AUTO: 28.7 PG (ref 27–34)
MCHC RBC AUTO-ENTMCNC: 34 % (ref 32–36)
MCV RBC AUTO: 84.4 FL (ref 80–100)
MONOCYTE #: 0.4 TH/MM3 (ref 0–0.9)
MONOCYTES NFR BLD: 6.3 % (ref 0–8)
NEUTROPHILS # BLD AUTO: 3.6 TH/MM3 (ref 1.8–7.7)
NEUTROPHILS NFR BLD AUTO: 55.4 % (ref 16–70)
PLATELET # BLD: 302 TH/MM3 (ref 150–450)
PMV BLD AUTO: 6.9 FL (ref 7–11)
PROT SERPL-MCNC: 5.8 GM/DL (ref 6.4–8.2)
RBC # BLD AUTO: 3.96 MIL/MM3 (ref 4–5.3)
SODIUM SERPL-SCNC: 138 MEQ/L (ref 136–145)
WBC # BLD AUTO: 6.6 TH/MM3 (ref 4–11)

## 2018-03-14 RX ADMIN — PHENYTOIN SODIUM SCH MLS/HR: 50 INJECTION INTRAMUSCULAR; INTRAVENOUS at 12:04

## 2018-03-14 RX ADMIN — SODIUM CHLORIDE SCH MLS/HR: 900 INJECTION INTRAVENOUS at 15:09

## 2018-03-14 RX ADMIN — LEVOFLOXACIN SCH MLS/HR: 5 INJECTION, SOLUTION INTRAVENOUS at 12:01

## 2018-03-14 RX ADMIN — CLONIDINE HYDROCHLORIDE PRN MG: 0.1 INJECTION, SOLUTION EPIDURAL at 16:50

## 2018-03-14 RX ADMIN — CLONIDINE HYDROCHLORIDE PRN MG: 0.1 INJECTION, SOLUTION EPIDURAL at 04:20

## 2018-03-14 RX ADMIN — SODIUM CHLORIDE SCH MLS/HR: 900 INJECTION INTRAVENOUS at 04:19

## 2018-03-14 RX ADMIN — PHENYTOIN SODIUM SCH MLS/HR: 50 INJECTION INTRAMUSCULAR; INTRAVENOUS at 01:25

## 2018-03-14 RX ADMIN — LITHIUM CARBONATE SCH MG: 300 CAPSULE, GELATIN COATED ORAL at 08:24

## 2018-03-14 RX ADMIN — LITHIUM CARBONATE SCH MG: 300 CAPSULE, GELATIN COATED ORAL at 20:57

## 2018-03-14 NOTE — PD.POD
Subjective


Pain score:  3


Remarks


Patient feels unwell and may be going through withdrawal however improvement of 

the left ankle noted





Past Med/Surg/Social History


Social History


Smoking Status:  Current Every Day Smoker





Objective


Vital Signs





Vital Signs








  Date Time  Temp Pulse Resp B/P (MAP) Pulse Ox O2 Delivery O2 Flow Rate FiO2


 


3/14/18 17:42   16     


 


3/14/18 16:03 98.2 82 18 140/ 99   


 


3/14/18 11:56  80      


 


3/14/18 11:30 98.0 86 18 112/71 (85) 99   


 


3/14/18 07:37 97.9 74 18 142/102 (115) 95   


 


3/14/18 04:00 98.3 60 18 124/79 (94) 100   


 


3/14/18 00:00 99.0 75 17 134/69 (90) 100   


 


3/13/18 20:00 97.5 97 18 129/83 (98) 98   








Coded Allergies:  


     *MDRO Multi-Drug Resistant Organism (Verified  Adverse Reaction, Unknown, 3

/13/18)


 MRSA (arm wound) - 10/2015


 MRSA PCR Screen NEGATIVE - 3/24/2016 & 7/28/16


 ** Cleared per Infection Control **


Medications and IVs





Laboratory Tests








Test


  3/13/18


07:40 3/14/18


07:20


 


White Blood Count 14.4 TH/MM3  6.6 TH/MM3 


 


Red Blood Count 4.70 MIL/MM3  3.96 MIL/MM3 


 


Hemoglobin 14.1 GM/DL  11.4 GM/DL 


 


Hematocrit 39.9 %  33.5 % 


 


Mean Corpuscular Volume 84.9 FL  84.4 FL 


 


Mean Corpuscular Hemoglobin 30.0 PG  28.7 PG 


 


Mean Corpuscular Hemoglobin


Concent 35.3 % 


  34.0 % 


 


 


Red Cell Distribution Width 14.5 %  15.0 % 


 


Platelet Count 494 TH/MM3  302 TH/MM3 


 


Mean Platelet Volume 7.1 FL  6.9 FL 


 


Neutrophils (%) (Auto) 63.7 %  55.4 % 


 


Lymphocytes (%) (Auto) 27.5 %  36.1 % 


 


Monocytes (%) (Auto) 8.0 %  6.3 % 


 


Eosinophils (%) (Auto) 0.5 %  1.9 % 


 


Basophils (%) (Auto) 0.3 %  0.3 % 


 


Neutrophils # (Auto) 9.2 TH/MM3  3.6 TH/MM3 


 


Lymphocytes # (Auto) 4.0 TH/MM3  2.4 TH/MM3 


 


Monocytes # (Auto) 1.1 TH/MM3  0.4 TH/MM3 


 


Eosinophils # (Auto) 0.1 TH/MM3  0.1 TH/MM3 


 


Basophils # (Auto) 0.0 TH/MM3  0.0 TH/MM3 


 


CBC Comment DIFF FINAL  DIFF FINAL 


 


Differential Comment    


 


Erythrocyte Sedimentation Rate  24 mm/hr 








Laboratory Tests








Test


  3/13/18


07:40 3/13/18


11:45 3/14/18


07:20


 


Blood Urea Nitrogen 15 MG/DL   12 MG/DL 


 


Creatinine 0.77 MG/DL   0.45 MG/DL 


 


Random Glucose 150 MG/DL   85 MG/DL 


 


Total Protein 8.3 GM/DL   5.8 GM/DL 


 


Albumin 3.2 GM/DL   2.2 GM/DL 


 


Calcium Level 8.7 MG/DL   8.2 MG/DL 


 


Alkaline Phosphatase 159 U/L   96 U/L 


 


Aspartate Amino Transf


(AST/SGOT) 41 U/L 


  


  27 U/L 


 


 


Alanine Aminotransferase


(ALT/SGPT) 30 U/L 


  


  19 U/L 


 


 


Total Bilirubin 0.3 MG/DL   0.3 MG/DL 


 


Sodium Level 137 MEQ/L   138 MEQ/L 


 


Potassium Level 3.2 MEQ/L   3.8 MEQ/L 


 


Chloride Level 101 MEQ/L   107 MEQ/L 


 


Carbon Dioxide Level 20.9 MEQ/L   19.8 MEQ/L 


 


Anion Gap 15 MEQ/L   11 MEQ/L 


 


Estimat Glomerular Filtration


Rate 89 ML/MIN 


  


  166 ML/MIN 


 


 


Human Chorionic Gonadotropin,


Quant LESS THAN 1


MIU/ML 


  


 


 


Lactic Acid Level  2.0 mmol/L  


 


Total Creatine Kinase   35 U/L 


 


C-Reactive Protein   1.50 MG/DL 








Microbiology








 Date/Time


Source Procedure


Growth Status


 


 


 3/13/18 11:45


Blood Peripheral Aerobic Blood Culture - Preliminary


NO GROWTH IN 1 DAY Resulted


 


 3/13/18 11:45


Blood Peripheral Anaerobic Blood Culture - Preliminary


NO GROWTH IN 1 DAY Resulted





 3/13/18 11:40


Blood Peripheral Aerobic Blood Culture - Preliminary


NO GROWTH IN 1 DAY Resulted


 


 3/13/18 11:40


Blood Peripheral Anaerobic Blood Culture - Preliminary


NO GROWTH IN 1 DAY Resulted





 3/13/18 20:10


Wound Ankle Acid Fast Stain


Pending Received


 


 3/13/18 20:10


Wound Ankle Mycobacterial Culture


Pending Received








Other Results





Last 72 hours Impressions








Ankle MRI 3/13/18 0000 Signed





Impressions: 





 Service Date/Time:  Tuesday, March 13, 2018 20:45 - CONCLUSION:  1. Focal deep 





 soft tissue ulceration within a broad area of cellulitis posterolaterally of 

the 





 distal leg. Mild, reactive appearing marrow edema in the distal shaft of the 





 fibula without convincing evidence of osteomyelitis at this time. No abscess. 

2. 





 Moderate distal and insertional tendinosis of Achilles with low-grade partial 





 tearing along the deep margin and retrocalcaneal bursitis.     Martín Palm MD 











Physical Exam


Remarks


Left ankle decreased redness or drainage and edema mixed serosanguineous with 

decreased purulence lateral ankle ulcer approximately 3 cm 3 cm down to deep 

dermis with no obvious tendon exposed no bone exposed





Assessment & Plan


A/P


Left ankle abscess cellulitis





MRI reviewed, clinical improvement noted.





Continue wound care per nursing.  Follow-up 1-2 days.  No need for deep 

incision and drainage however bedside debridement may not be tolerable.





Given the severity and depth of ulcer infection, may need IV antibiotics for 1-

2 weeks





Will sign out to Eric Sinclair DPM Mar 14, 2018 18:07

## 2018-03-14 NOTE — HHI.PR
Subjective


Remarks


in no acute distress.


not cooperative with the exam.


complaining of pain to the left foot.


afebrile.





Objective


Vitals





Vital Signs








  Date Time  Temp Pulse Resp B/P (MAP) Pulse Ox O2 Delivery O2 Flow Rate FiO2


 


3/14/18 07:37 97.9 74 18 142/102 (115) 95   


 


3/14/18 05:26   18     


 


3/14/18 04:00 98.3 60 18 124/79 (94) 100   


 


3/14/18 00:00 99.0 75 17 134/69 (90) 100   


 


3/13/18 20:00 97.5 97 18 129/83 (98) 98   


 


3/13/18 17:55 98.3 102 19 115/81 (92) 98   


 


3/13/18 15:30 97.7 85 20 125/77 (93)    


 


3/13/18 14:46        














I/O      


 


 3/13/18 3/13/18 3/13/18 3/14/18 3/14/18 3/14/18





 07:00 15:00 23:00 07:00 15:00 23:00


 


Intake Total  443 ml    


 


Balance  443 ml    


 


      


 


Intake Oral  360 ml    


 


Other  83 ml    


 


# Voids     1 


 


# Bowel Movements     0 








Result Diagram:  


3/13/18 0740                                                                   

             3/13/18 0740





Imaging





Last Impressions








Ankle MRI 3/13/18 0000 Signed





Impressions: 





 Service Date/Time:  Tuesday, March 13, 2018 20:45 - CONCLUSION:  1. 











Focal deep 


 soft tissue ulceration within a broad area of cellulitis posterolaterally of 

the 


 distal leg. Mild, reactive appearing marrow edema in the distal shaft of the 


 fibula without convincing evidence of osteomyelitis at this time. No abscess. 

2. 


 Moderate distal and insertional tendinosis of Achilles with low-grade partial 


 tearing along the deep margin and retrocalcaneal bursitis. 

















    Martín Palm MD 








Objective Remarks


GENERAL: This is a well-nourished, well-developed patient, in no apparent 

distress.


CARDIOVASCULAR: Regular rate and regular rhythm without murmurs, gallops, or 

rubs. 


RESPIRATORY: Clear to auscultation. Breath sounds equal bilaterally. No wheezes

, rales, or rhonchi.  


GASTROINTESTINAL: Abdomen soft, non-tender, nondistended. 


Normal, active bowel sounds


MUSCULOSKELETAL: mild swelling and erythema over the left ankle.


NEURO:  Alert & Oriented x4 to person, place, time, situation.  Moves all ext x4


Medications and IVs





Inpatient Medications


Flumazenil (Romazicon Inj) 0.2 mg Q1M  PRN IV PUSH SEE LABEL COMMENTS;  Start 3/

13/18 at 13:30


Ketorolac Tromethamine (Toradol Inj) 15 mg Q6H  PRN IV PUSH pain >5 Last 

administered on 3/14/18at 04:20;  Start 3/13/18 at 20:45;  Stop 3/17/18 at 20:44


Levofloxacin/ Dextrose 150 ml @  100 mls/hr Q24H IV  Last administered on 3/13/

18at 16:33;  Start 3/13/18 at 14:00


Lithium Carbonate (Lithium Carbonate) 300 mg BID PO  Last administered on 3/14/

18at 08:24;  Start 3/14/18 at 09:00


Lorazepam (Ativan Inj) 2 mg Q15M  PRN IV PUSH CIWA > 20;  Start 3/13/18 at 13:30


Lorazepam (Ativan) 2 mg Q2H  PRN PO CIWA 11-14;  Start 3/13/18 at 13:30


Mirtazapine (Remeron) 15 mg HS PO ;  Start 3/14/18 at 21:00


Miscellaneous Information SPECIFIC LAB TO BE DRAWN:VANCO TROUGH DATE TO... ONCE

  ONCE .XX ;  Start 3/15/18 at 03:45;  Stop 3/15/18 at 03:46


Pharmacy Profile Note 0 ml @ 0 mls/hr UNSCH OTHER ;  Start 3/13/18 at 13:30


Risperidone (risperDAL) 3 mg Q12HR PO  Last administered on 3/14/18at 08:24;  

Start 3/14/18 at 09:00


Sodium Chloride 1,000 ml @  84 mls/hr [id number] IV  Last administered on 3/13/

18at 16:34;  Start 3/13/18 at 13:30


Trimethoprim/ Sulfamethoxazole (Bactrim Ds 800-160 Mg) 1 tab ONCE  ONCE PO  

Last administered on 3/13/18at 10:19;  Start 3/13/18 at 10:15;  Stop 3/13/18 at 

10:17;  Status DC


Vancomycin HCl 750 mg/Sodium Chloride 257.5 ml @  250 mls/hr Q12H IV  Last 

administered on 3/14/18at 04:19;  Start 3/13/18 at 16:00





A/P


Assessment and Plan


A/P   





Sepsis/ infected ulcer of the left ankle


   MRI of the left ankle with focal deep  soft tissue ulceration within a broad 

area of cellulitis posterolaterally of the 


   distal leg. Mild, reactive appearing marrow edema in the distal shaft of the

  fibula without convincing evidence of osteomyelitis at this time. No abscess. 


   Moderate distal and insertional tendinosis of Achilles with low-grade 

partial  tearing along the deep margin and retrocalcaneal bursitis. 


-Continue Levaquin and vancomycin.


-Podiatry following.





Behavioral disturbance


-Given positive tox screen for cocaine as well as elevated blood alcohol level 

yet with history of bipolar per the medical records.


-psych consult appreciated; doesn't meet the criteria for inpatient psych 

evaluation.


-restarted on her home meds.





Cocaine


-Monitor on telemetry, IV fluids, monitor for rhabdo, CPK pending.


-will verify the home methadone dose.





Elevated alcohol levels


-[facility] protocol


Discharge Planning


awaiting podiatry follow-up and cultures.











Vonda Harrison MD Mar 14, 2018 09:38
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
## 2018-03-15 VITALS
SYSTOLIC BLOOD PRESSURE: 137 MMHG | RESPIRATION RATE: 18 BRPM | DIASTOLIC BLOOD PRESSURE: 74 MMHG | TEMPERATURE: 98.3 F | OXYGEN SATURATION: 99 % | HEART RATE: 93 BPM

## 2018-03-15 VITALS — HEART RATE: 66 BPM

## 2018-03-15 VITALS
TEMPERATURE: 98.6 F | HEART RATE: 85 BPM | RESPIRATION RATE: 18 BRPM | DIASTOLIC BLOOD PRESSURE: 85 MMHG | SYSTOLIC BLOOD PRESSURE: 126 MMHG | OXYGEN SATURATION: 99 %

## 2018-03-15 VITALS
OXYGEN SATURATION: 99 % | DIASTOLIC BLOOD PRESSURE: 81 MMHG | HEART RATE: 66 BPM | TEMPERATURE: 98.6 F | SYSTOLIC BLOOD PRESSURE: 114 MMHG | RESPIRATION RATE: 18 BRPM

## 2018-03-15 VITALS
HEART RATE: 68 BPM | RESPIRATION RATE: 18 BRPM | DIASTOLIC BLOOD PRESSURE: 82 MMHG | SYSTOLIC BLOOD PRESSURE: 133 MMHG | OXYGEN SATURATION: 100 % | TEMPERATURE: 97.8 F

## 2018-03-15 RX ADMIN — SODIUM CHLORIDE SCH MLS/HR: 900 INJECTION INTRAVENOUS at 03:24

## 2018-03-15 RX ADMIN — PHENYTOIN SODIUM SCH MLS/HR: 50 INJECTION INTRAMUSCULAR; INTRAVENOUS at 08:06

## 2018-03-15 RX ADMIN — PHENYTOIN SODIUM SCH MLS/HR: 50 INJECTION INTRAMUSCULAR; INTRAVENOUS at 02:52

## 2018-03-15 RX ADMIN — CLONIDINE HYDROCHLORIDE PRN MG: 0.1 INJECTION, SOLUTION EPIDURAL at 08:08

## 2018-03-15 RX ADMIN — CLONIDINE HYDROCHLORIDE PRN MG: 0.1 INJECTION, SOLUTION EPIDURAL at 14:56

## 2018-03-15 RX ADMIN — CLONIDINE HYDROCHLORIDE PRN MG: 0.1 INJECTION, SOLUTION EPIDURAL at 02:51

## 2018-03-15 RX ADMIN — LITHIUM CARBONATE SCH MG: 300 CAPSULE, GELATIN COATED ORAL at 08:08

## 2018-03-15 RX ADMIN — LEVOFLOXACIN SCH MLS/HR: 5 INJECTION, SOLUTION INTRAVENOUS at 14:52

## 2018-03-15 NOTE — HHI.PR
Subjective


Remarks


Patient states she is doing okay.  Difficult to communicate with her.  She just 

wants to sleep.  Per nursing, she has been refusing labs.





Objective


Vitals





Vital Signs








  Date Time  Temp Pulse Resp B/P (MAP) Pulse Ox O2 Delivery O2 Flow Rate FiO2


 


3/15/18 09:59  66      


 


3/15/18 08:00 98.6 66 18 114/81 (92) 99   


 


3/15/18 04:00 97.8 68 18 133/82 (99) 100   


 


3/15/18 04:00  88      


 


3/15/18 00:00  93      


 


3/15/18 00:00 98.3 86 18 137/74 (95) 99   


 


3/14/18 20:00 98.1 72 18 138/83 (101) 100   


 


3/14/18 20:00  75      


 


3/14/18 17:42   16     


 


3/14/18 16:03 98.2 82 18 140/ 99   


 


3/14/18 11:56  80      


 


3/14/18 11:30 98.0 86 18 112/71 (85) 99   














I/O      


 


 3/14/18 3/14/18 3/14/18 3/15/18 3/15/18 3/15/18





 07:00 15:00 23:00 07:00 15:00 23:00


 


Intake Total    897 ml  


 


Balance    897 ml  


 


      


 


IV Total    897 ml  


 


# Voids  1   4 


 


# Bowel Movements  0    








Result Diagram:  


3/14/18 0720                                                                   

             3/14/18 0720





Imaging





Last Impressions








Ankle MRI 3/13/18 0000 Signed





Impressions: 





 Service Date/Time:  Tuesday, March 13, 2018 20:45 - CONCLUSION:  1. Focal deep 





 soft tissue ulceration within a broad area of cellulitis posterolaterally of 

the 





 distal leg. Mild, reactive appearing marrow edema in the distal shaft of the 





 fibula without convincing evidence of osteomyelitis at this time. No abscess. 

2. 





 Moderate distal and insertional tendinosis of Achilles with low-grade partial 





 tearing along the deep margin and retrocalcaneal bursitis.     Martín Palm MD 








Objective Remarks


GENERAL: Very flat affect, in no apparent distress.


CARDIOVASCULAR: Normal rate and regular rhythm without murmurs, gallops, or 

rubs. 


RESPIRATORY: Good respiratory efforts. Breath sounds equal and clear to 

auscultation bilaterally.


GASTROINTESTINAL: Abdomen soft, non-tender, non-distended. Normal active bowel 

sounds


MUSCULOSKELETAL: Left lateral ankle has deep ulcerated lesion with mild 

surrounding cellulitis.


NEURO:  Alert & Oriented x4 to person, place, time, situation.  Moves all ext x4


PSYCH: Flat affect.





A/P


Assessment and Plan


28 Y/O female with:





Sepsis/ infected ulcer of the left ankle


   MRI of the left ankle with focal deep  soft tissue ulceration within a broad 

area of cellulitis posterolaterally of the 


   distal leg. Mild, reactive appearing marrow edema in the distal shaft of the

  fibula without convincing evidence of osteomyelitis at this time. No abscess. 


   Moderate distal and insertional tendinosis of Achilles with low-grade 

partial  tearing along the deep margin and retrocalcaneal bursitis. 


-Continue Levaquin and vancomycin.


-Podiatry following and recommending prolonged IV antibiotics. 


- Consult ID for abx recs. 


- Patient counseled on compliance.





Behavioral disturbance


-Positive tox screen for cocaine as well as elevated blood alcohol level yet 

with history of bipolar per the medical records.


-psych consult appreciated; doesn't meet the criteria for inpatient psych 

evaluation.


-restarted on her home meds.





Cocaine


-Patient counseled on drug use. Was reportedly on Methadone but obviously using 

illicit drugs on top of it. Not safe to resume Methadone in this patient. 





Elevated alcohol levels


-[facility] protocol


Discharge Planning


Consult ID. Need IV Abx.











Radha Mora MD Mar 15, 2018 11:08

## 2018-03-15 NOTE — HHI.DS
__________________________________________________





Discharge Summary


Admission Date


Mar 13, 2018 at 11:13


Discharge Date:  Mar 15, 2018


Admitting Diagnosis





Sepsis, wound infection, IV drug abuse





(1) Sepsis


ICD Code:  A41.9 - Sepsis, unspecified organism


Status:  Acute


(2) IV drug user


ICD Code:  F19.90 - Other psychoactive substance use, unspecified, uncomplicated


Status:  Acute


(3) Wound cellulitis


ICD Code:  L03.90 - Cellulitis, unspecified


Status:  Acute


Procedures


None


Brief History - From Admission


27-year-old white female being admitted for sepsis as well as being Baker acted 

secondary to intoxicated behavior.





Patient was in her usual state of health up until at some point when she was 

found by police walking around publicly screaming, appeared to be intoxicated.  

She was Phillips acted by the police and brought to the emergency department.  

Patient herself is a very limited historian, on multiple occasions she has told 

me to "get out of my face" with a very flat affect when being asked questions 

about her transpiring events.  Patient denies having any pain.  But we palpate 

her left foot which is clearly edematous and infected, she flinches and says it 

hurts.  She herself denies any suicidal ideation but according to the police 

report she did endorse it with them.  Patient does admit to injecting herself 

with IV drugs into her leg but she does not specify if this is the same leg as 

to where she has her ulcerated lesion.  When asked if she has any nausea 

vomiting fevers or chills she does not answer those questions.  She had one-

point does endorse that she has a prior history of endocarditis.


CBC/BMP:  


3/14/18 0720                                                                   

             3/14/18 0720





Significant Findings





Laboratory Tests








Test


  3/13/18


07:40 3/13/18


09:45 3/13/18


11:45 3/14/18


07:20


 


White Blood Count


  14.4 TH/MM3


(4.0-11.0) 


  


  


 


 


Platelet Count


  494 TH/MM3


(150-450) 


  


  


 


 


Neutrophils # (Auto)


  9.2 TH/MM3


(1.8-7.7) 


  


  


 


 


Monocytes # (Auto)


  1.1 TH/MM3


(0-0.9) 


  


  


 


 


Random Glucose


  150 MG/DL


() 


  


  


 


 


Total Protein


  8.3 GM/DL


(6.4-8.2) 


  


  5.8 GM/DL


(6.4-8.2)


 


Albumin


  3.2 GM/DL


(3.4-5.0) 


  


  2.2 GM/DL


(3.4-5.0)


 


Alkaline Phosphatase


  159 U/L


() 


  


  


 


 


Aspartate Amino Transf


(AST/SGOT) 41 U/L (15-37) 


  


  


  


 


 


Potassium Level


  3.2 MEQ/L


(3.5-5.1) 


  


  


 


 


Carbon Dioxide Level


  20.9 MEQ/L


(21.0-32.0) 


  


  19.8 MEQ/L


(21.0-32.0)


 


Ethyl Alcohol Level


  268 MG/DL


(0-5) 


  


  


 


 


Urine Cocaine Screen  POS (NEG)   


 


Red Blood Count


  


  


  


  3.96 MIL/MM3


(4.00-5.30)


 


Hemoglobin


  


  


  


  11.4 GM/DL


(11.6-15.3)


 


Hematocrit


  


  


  


  33.5 %


(35.0-46.0)


 


Mean Platelet Volume


  


  


  


  6.9 FL


(7.0-11.0)


 


Erythrocyte Sedimentation Rate


  


  


  


  24 mm/hr


(0-20)


 


Creatinine


  


  


  


  0.45 MG/DL


(0.50-1.00)


 


Calcium Level


  


  


  


  8.2 MG/DL


(8.5-10.1)


 


C-Reactive Protein


  


  


  


  1.50 MG/DL


(0.00-0.30)


 


Test


  3/15/18


12:35 


  


  


 


 


Lithium Level


  0.4 MEQ/L


(0.5-1.5) 


  


  


 








PE at Discharge


GENERAL: Very flat affect, in no apparent distress.


CARDIOVASCULAR: Normal rate and regular rhythm without murmurs, gallops, or 

rubs. 


RESPIRATORY: Good respiratory efforts. Breath sounds equal and clear to 

auscultation bilaterally.


GASTROINTESTINAL: Abdomen soft, non-tender, non-distended. Normal active bowel 

sounds


MUSCULOSKELETAL: Left lateral ankle has deep ulcerated lesion with mild 

surrounding cellulitis.


NEURO:  Alert & Oriented x4 to person, place, time, situation.  Moves all ext x4


PSYCH: Flat affect.


Hospital Course


26 Y/O female admitted with sepsis and infected ulcer of the ankle.  Ongoing 

treatment with IV antibiotics and consultants follow-up.  Unfortunately the 

patient decided to leave the hospital AGAINST MEDICAL ADVICE.





Sepsis/ infected ulcer of the left ankle


   MRI of the left ankle with focal deep  soft tissue ulceration within a broad 

area of cellulitis posterolaterally of the 


   distal leg. Mild, reactive appearing marrow edema in the distal shaft of the

  fibula without convincing evidence of osteomyelitis at this time. No abscess. 


   Moderate distal and insertional tendinosis of Achilles with low-grade 

partial  tearing along the deep margin and retrocalcaneal bursitis. 


-Continue Levaquin and vancomycin.


-Podiatry following and recommending prolonged IV antibiotics. 


- Consult ID for abx recs. 


- Patient was counseled on compliance.





Behavioral disturbance


-Positive tox screen for cocaine as well as elevated blood alcohol level yet 

with history of bipolar per the medical records.


-psych consult appreciated; doesn't meet the criteria for inpatient psych 

evaluation.


-restarted on her home meds.





Cocaine


-Patient counseled on drug use. Was reportedly on Methadone but obviously using 

illicit drugs on top of it. Not safe to resume Methadone in this patient.


Pt Condition on Discharge:  Fair


Discharge Disposition:  Discharge Home


Discharge Time:  <= 30 minutes











Radha Mora MD Mar 15, 2018 16:39

## 2018-05-03 NOTE — PD
HPI


Chief Complaint:  Psychiatric Symptoms


Time Seen by Provider:  10:06


Travel History


International Travel<30 days:  No


Contact w/Intl Traveler<30days:  No


Traveled to known affect area:  No





History of Present Illness


HPI


28-year-old  female brought in under the Phillips act by [place] 

Police Department.  The patient was found walking in the middle of a busy 

[place] road when she fly down the police.  At that time the patient's fianc 

and friend arrived on the scene and the patient again ran into traffic almost 

being hit by numerous vehicles.  The patient said that her fianc and friend 

were trying to kill her.  She initially states that she needed help because she 

was running from the voices in her head.  Patient has history of cocaine abuse.





PFSH


Past Medical History


Hx Anticoagulant Therapy:  No


ADHD:  Yes


Asthma:  Yes


Autoimmune Disease:  No


Anxiety:  Yes


Depression:  No


Cancer:  No


Cardiovascular Problems:  No


Chemotherapy:  No


Chest Pain:  Yes


Congestive Heart Failure:  No


Cerebrovascular Accident:  No


Diabetes:  No


Diminished Hearing:  No


Gastrointestinal Disorders:  Yes


Genitourinary:  No


Hypertension:  No


Immune Disorder:  No


Implanted Vascular Access Dvce:  No


Musculoskeletal:  No


Neurologic:  No


Psychiatric:  Yes


Reproductive:  No


Respiratory:  No


Immunizations Current:  Yes


Seizures:  Yes (PATIENT DENIES)


Thyroid Disease:  Yes


Ulcer:  No


Pregnant?:  Unknown


:  3


Para:  2


Miscarriage:  1





Past Surgical History


Appendectomy:  No


Body Medical Devices:  BORDERLINE PERSONALITY; POLYSUBSTANCE ABUSE; ALCOHOL 

ABUSE; DEFIANT DISORDE


 Section:  Yes (X2)


Cholecystectomy:  No


Gynecologic Surgery:  Yes (C SECTIONS x2)


Hysterectomy:  No


Other Surgery:  Yes (LEFT FOREARM SURGERY)





Social History


Alcohol Use:  Yes


Tobacco Use:  Yes


Substance Use:  Yes (HX OF IV DRUG USE. POLYSUBSTANCE)





Allergies-Medications


(Allergen,Severity, Reaction):  


Coded Allergies:  


     *MDRO Multi-Drug Resistant Organism (Verified  Adverse Reaction, Unknown, 5

/3/18)


 MRSA (arm wound) - 10/2015


 MRSA PCR Screen NEGATIVE - 3/24/2016 & 16


 ** Cleared per Infection Control **


Reported Meds & Prescriptions





Reported Meds & Active Scripts


Active


Reported


Methadone Intensol Liq (Methadone HCl) 10 Mg/Ml Conc 130 Mg PO DAILY


Lithium Carbonate 300 Mg Cap 300 Mg PO BID


Risperidone 3 Mg Tab 3 Mg PO Q12HR


Mirtazapine 15 Mg Tab 15 Mg PO HS








Review of Systems


ROS Limitations:  Intoxication, Altered Mental Status, Uncooperative, Combative


Except as stated in HPI:  all other systems reviewed are Neg


General / Constitutional:  No: Fever


Eyes:  No: Visual changes


HENT:  No: Headaches


Cardiovascular:  No: Chest Pain or Discomfort


Respiratory:  No: Shortness of Breath


Gastrointestinal:  No: Abdominal Pain


Genitourinary:  No: Dysuria


Musculoskeletal:  No: Pain


Skin:  No Rash


Neurologic:  No: Weakness


Psychiatric:  No: Depression


Endocrine:  No: Polydipsia


Hematologic/Lymphatic:  No: Easy Bruising





Physical Exam


Exam Limitations:  Intoxication, Altered Mental Status, Uncooperative, Combative

, Psychotic


Narrative


GENERAL: Patient is unable to answer questions appropriately.  She appears to 

be reacting to internal stimuli.


SKIN: Warm and dry.  Normal color.  Normal turgor.  Multiple puncture wounds 

from previous IV drug use apparent.  No obvious signs of infection currently.


HEAD: Atraumatic. Normocephalic. 


EYES: Pupils equal and round. No scleral icterus. No injection or drainage. 


ENT: No nasal bleeding or discharge.  Mucous membranes pink and moist.  Pharynx 

is clear.  Airways patent


NECK: Trachea midline.  Supple nontender per


CARDIOVASCULAR: Regular rate and rhythm.  


RESPIRATORY: No accessory muscle use. Clear to auscultation. Breath sounds 

equal bilaterally. 


GASTROINTESTINAL: Abdomen soft, non-tender, nondistended. Hepatic and splenic 

margins not palpable. 


MUSCULOSKELETAL: Extremities without clubbing, cyanosis, or edema. No obvious 

deformities. 


NEUROLOGICAL: Awake and alert. No obvious cranial nerve deficits.  Motor 

grossly within normal limits. Five out of 5 muscle strength in the arms and 

legs.  Normal speech.


PSYCHIATRIC: Appears psychotic, and reacting to internal stimuli.





Data


Data


Last Documented VS





Orders





 Orders


Complete Blood Count With Diff (5/3/18 10:08)


Comprehensive Metabolic Panel (5/3/18 10:08)


Thyroid Stimulating Hormone (5/3/18 10:08)


Urinalysis - C+S If Indicated (5/3/18 10:08)


Ed Urine Pregnancytest Poc (5/3/18 10:08)


Iv Access Insert/Monitor (5/3/18 10:08)


Psych Screen (5/3/18 10:08)


Sodium Chloride 0.9% Flush (Ns Flush) (5/3/18 10:15)


Haloperidol Inj (Haldol Inj) (5/3/18 10:15)


Lorazepam Inj (Ativan Inj) (5/3/18 10:15)


Restraints Non-Violent MARV.Q3H (5/3/18 10:08)


Drug Screen, Random Urine (5/3/18 10:08)


Alcohol (Ethanol) (5/3/18 10:08)


Diphenhydramine Inj (Benadryl Inj) (5/3/18 10:15)


Sodium Chlor 0.9% 1000 Ml Inj (Ns 1000 M (5/3/18 10:15)


Lorazepam Inj (Ativan Inj) (5/3/18 10:30)


Haloperidol Inj (Haldol Inj) (5/3/18 10:30)


Diphenhydramine Inj (Benadryl Inj) (5/3/18 10:30)


Lorazepam Inj (Ativan Inj) (5/3/18 12:45)


Restraints Non-Violent MARV.Q3H (5/3/18 12:43)


Vascular Access Team Consult/P PRN (5/3/18 12:43)


Vascular Poc Ultrasound (5/3/18 )


Diet Regular Basic (5/3/18 Dinner)


Diet Regular Basic (18 Breakfast)


Ed Discharge Order (18 10:18)


^ Other Nursing Orders (18 10:32)





Labs





Laboratory Tests








Test


  5/3/18


13:52 18


01:20


 


White Blood Count 12.5 TH/MM3  


 


Red Blood Count 4.59 MIL/MM3  


 


Hemoglobin 13.9 GM/DL  


 


Hematocrit 40.9 %  


 


Mean Corpuscular Volume 89.0 FL  


 


Mean Corpuscular Hemoglobin 30.3 PG  


 


Mean Corpuscular Hemoglobin


Concent 34.0 % 


  


 


 


Red Cell Distribution Width 13.4 %  


 


Platelet Count 281 TH/MM3  


 


Mean Platelet Volume 7.4 FL  


 


Neutrophils (%) (Auto) 69.2 %  


 


Lymphocytes (%) (Auto) 23.3 %  


 


Monocytes (%) (Auto) 6.8 %  


 


Eosinophils (%) (Auto) 0.3 %  


 


Basophils (%) (Auto) 0.4 %  


 


Neutrophils # (Auto) 8.6 TH/MM3  


 


Lymphocytes # (Auto) 2.9 TH/MM3  


 


Monocytes # (Auto) 0.8 TH/MM3  


 


Eosinophils # (Auto) 0.0 TH/MM3  


 


Basophils # (Auto) 0.1 TH/MM3  


 


CBC Comment DIFF FINAL  


 


Differential Comment   


 


Blood Urea Nitrogen 19 MG/DL  


 


Creatinine 0.82 MG/DL  


 


Random Glucose 93 MG/DL  


 


Total Protein 8.4 GM/DL  


 


Albumin 4.3 GM/DL  


 


Calcium Level 9.1 MG/DL  


 


Alkaline Phosphatase 98 U/L  


 


Aspartate Amino Transf


(AST/SGOT) 41 U/L 


  


 


 


Alanine Aminotransferase


(ALT/SGPT) 40 U/L 


  


 


 


Total Bilirubin 1.0 MG/DL  


 


Sodium Level 139 MEQ/L  


 


Potassium Level 4.0 MEQ/L  


 


Chloride Level 106 MEQ/L  


 


Carbon Dioxide Level 23.5 MEQ/L  


 


Anion Gap 10 MEQ/L  


 


Estimat Glomerular Filtration


Rate 82 ML/MIN 


  


 


 


Thyroid Stimulating Hormone


3rd Gen 5.050 uIU/ML 


  


 


 


Ethyl Alcohol Level


  LESS THAN 3


MG/DL 


 


 


Urine Color  YELLOW 


 


Urine Turbidity  CLEAR 


 


Urine pH  6.5 


 


Urine Specific Gravity  1.024 


 


Urine Protein  30 mg/dL 


 


Urine Glucose (UA)  NEG mg/dL 


 


Urine Ketones  10 mg/dL 


 


Urine Occult Blood  NEG 


 


Urine Nitrite  NEG 


 


Urine Bilirubin  NEG 


 


Urine Urobilinogen  2.0 MG/DL 


 


Urine Leukocyte Esterase  NEG 


 


Urine RBC  1 /hpf 


 


Urine WBC  5 /hpf 


 


Urine Squamous Epithelial


Cells 


  3 /hpf 


 


 


Urine Hyaline Casts  3 /lpf 


 


Urine Mucus  MANY /lpf 


 


Microscopic Urinalysis Comment


  


  CULT NOT


INDICATED


 


Urine Opiates Screen  NEG 


 


Urine Barbiturates Screen  NEG 


 


Urine Amphetamines Screen  POS 


 


Urine Benzodiazepines Screen  NEG 


 


Urine Cocaine Screen  POS 


 


Urine Cannabinoids Screen  NEG 











MDM


Medical Decision Making


Medical Screen Exam Complete:  Yes


Emergency Medical Condition:  Yes


Medical Record Reviewed:  Yes


Differential Diagnosis


Psychosis.  Metabolic imbalance.  Substance abuse


Narrative Course


Patient is combative, and unable to follow commands.


Patient is placed in soft restraints and given 1 mg lorazepam IM, 5 mg Haldol IM

, and 50 mg diphenhydramine IM.


Labs are attempted, but unable to be obtained


Labs ordered including CBC, CMP, serum alcohol, and urinalysis with urine 

pregnancy and urine tox screen.


Patient is placed in soft restraints, and given additional 1 mg lorazepam.


IV is eventually placed utilizing ultrasound guidance placement.


Patient is given 1000 mL of normal saline bolus.


CBC shows slight leukocytosis of 12.5, otherwise unremarkable.


Chemistries are significant for BUN of 19, GFR is 82, AST is 41, total protein 

8.4.  TSH is noted to be elevated at 5.05


Serum alcohol is less than 3 .


Urinalysis unremarkable.


Patient is medically cleared for psychiatric evaluation.


Condition:  Stable











Arvind Arevalo May 3, 2018 10:08

## 2018-05-04 NOTE — MB
cc:

Jw Sibley MD

****

 

 

DATE OF CONSULTATION:

05/04/2018

 

PHYSICIAN REQUESTING CONSULTATION:

Emergency Department.

 

REASON FOR CONSULTATION:

Phillips Act.

 

HISTORY OF PRESENT ILLNESS:

Ms. Keller is a 28-year-old female with reported history of 

schizophrenia and a chart history of polysubstance dependence, who 

presents under a Baker Act by law enforcement alleging that the 

patient was walking in the middle of a busy street saying that people 

were trying to kill her.  Of note, the patient's urine toxicology was 

positive for amphetamines and cocaine.  Reviewing the electronic 

medical record, I note that the patient was seen in consultation in 

mid March of this year by Dr. Haas, who entered the diagnosis of 

polysubstance abuse.

 

The patient is seen and examined.  Chart reviewed.  Case discussed 

with nursing staff.  No evidence of suicidality or homicidality since 

the patient has been under observation in the J pod.  On my 

examination this morning, the patient is clinically sober.  She denies

any suicidal or homicidal ideation, intent or plan and contracts for 

safety.  She denies any audio or visual hallucinations.  I can elicit 

no paranoia, no ideas of reference, no thought insertion or withdrawal

or other delusional material.  She denies any issues with mood and I 

can elicit no depressive or hypomanic/manic symptoms.  The remainder 

of the psychiatric ROS is negative.  The patient has no acute physical

complaints.  No reported withdrawal symptoms.  She is requesting 

discharge from the psychiatric emergency room this morning.

 

PAST PSYCHIATRIC HISTORY:

The patient reports a history of schizophrenia.  She is not currently 

seeing a psychiatrist nor is she taking psychotropic medications by 

her report.  She does report a history of psychiatric admissions in 

the past, but cannot recall when.  She denies a history of suicide 

attempts or violence.

 

FAMILY HISTORY:

The patient denies a family history of serious mental illness or 

suicide.

 

CHEMICAL DEPENDENCY HISTORY:

The patient admits to ongoing use of methamphetamine and powder 

cocaine.  She also reports some alcohol use.  She is precontemplative 

with regard to changing her pattern of use at this time, 

unfortunately.

 

SOCIAL HISTORY:

The patient reports that she lives alone.  She is single with no 

children.  She has a 9th grade education.  She has no income.  Denies 

any  history.  Denies any legal history.  Denies any access to

guns or firearms.

 

PAST MEDICAL HISTORY:

The patient denies any acute medical issues.

 

MEDICATIONS:

The patient reports that she is presently taking no psychotropic 

medications.

 

ALLERGIES:

NO LISTED ALLERGIES.

 

REVIEW OF SYSTEMS:

Except as noted in the HPI, this is negative.

 

PHYSICAL EXAMINATION:

VITAL SIGNS:  Temperature is 98.4, pulse 69, respirations 16, blood 

pressure 108/62, pulse oximetry 99% on room air.

GENERAL:  Physical examination was completed by the ED provider.  On 

my examination today, the patient appears to be in no acute physical 

distress.  No signs of intoxication or withdrawal noted.



MENTAL STATUS EXAMINATION:  The patient is in hospital attire.  She is

mildly disheveled, but maintaining basic hygiene.  She is awake and 

alert and oriented x 3.  No evidence of delirium.  No motor 

abnormalities noted.  Speech is within normal limits for rate, tone, 

and volume.  Language and fund of knowledge are average.  Focus and 

concentration are intact.  Memory grossly intact on clinical exam.  

Mood is appropriate and affect is somewhat blunted.  Thought process 

is linear.  No loosening of associations.  No delusional material 

elicited.  Denies audiovisual hallucinations.  Denies suicidal or 

homicidal ideation, intent or plan.  Contracts for safety.  Insight 

and judgment are perhaps fair at best generally, but poor with respect

to substance use issues.

 

LABORATORIES REVIEWED:

CBC reveals mild leukocytosis with a white blood cell count of 12.5.  

CMP reveals mild AST elevation at 41.  GFR is slightly decreased at 

82.  TSH is slightly elevated at 5.05.  Toxicology is positive for 

amphetamines and cocaine.  Alcohol level undetectable.  Urinalysis 

reveals 10+ ketones and 30+ protein.

 

ASSESSMENT AND PLAN:

1.  Polysubstance abuse, F19.10.

2.  Reported history of schizophrenia.

 

This is a 28-year-old female with psychiatric history as detailed 

above, who presents under a Baker Act.  On my examination today, the 

patient is clinically sober.  She denies any suicidal or homicidal 

ideation.  She contracts for safety.  She appears to be attending to 

her basic needs.  There is no evidence of any unstable mental illness 

as defined under the Baker Act in this patient at this time.  

Synthesizing this information based on the available evidence, I 

that the patient does not presently meet the Baker Act criteria.  I 

have lifted the Phillips Act.  The patient does undeniably have substance 

use issues, and I have strongly recommended that she allow us to 

transfer her to a chemical dependency treatment facility today, but 

she has unfortunately declined.  I have recommended that she pursue 

chemical dependency evaluation and treatment on an outpatient basis.  

It is my suspicion that her presenting behavioral disturbance was 

related to her substance use issues.  I have additionally counseled 

the patient to return to the psychiatric emergency room for any 

concerning psychiatric symptoms as part of a general safety plan.  The

patient is otherwise psychiatrically cleared for discharge from the 

ED.

 

Thank you very much for this consultation.

 

 









__________________________________

MD ZAYRA Lutz/KD

D: 05/04/2018, 10:31 AM

T: 05/04/2018, 11:00 AM

Visit #: [id number]

Job #: [id number]

CARRI